# Patient Record
Sex: FEMALE | Race: WHITE | NOT HISPANIC OR LATINO
[De-identification: names, ages, dates, MRNs, and addresses within clinical notes are randomized per-mention and may not be internally consistent; named-entity substitution may affect disease eponyms.]

---

## 2017-01-04 ENCOUNTER — LAB SERVICES (OUTPATIENT)
Dept: OTHER | Age: 32
End: 2017-01-04

## 2017-01-04 ENCOUNTER — CHARTING TRANS (OUTPATIENT)
Dept: OBGYN | Age: 32
End: 2017-01-04

## 2017-01-23 LAB — AP REPORT: NORMAL

## 2017-01-26 LAB — REPORT STATUS: NORMAL

## 2017-03-11 ENCOUNTER — LAB SERVICES (OUTPATIENT)
Dept: OTHER | Age: 32
End: 2017-03-11

## 2017-03-11 LAB
BILIRUBIN URINE: NEGATIVE
BLOOD URINE: ABNORMAL
CLARITY: CLEAR
COLOR: YELLOW
GLUCOSE QUALITATIVE U: NEGATIVE
KETONES, URINE: NEGATIVE
LEUKOCYTE ESTERASE URINE: NEGATIVE
NITRITE URINE: NEGATIVE
PH URINE: 7 (ref 5–7)
SPECIFIC GRAVITY URINE: 1.01 (ref 1–1.03)
URINE PROTEIN, QUAL (DIPSTICK): NEGATIVE
UROBILINOGEN URINE: <2

## 2017-03-13 ENCOUNTER — CHARTING TRANS (OUTPATIENT)
Dept: OTHER | Age: 32
End: 2017-03-13

## 2017-03-15 ENCOUNTER — CHARTING TRANS (OUTPATIENT)
Dept: OTHER | Age: 32
End: 2017-03-15

## 2017-03-18 ENCOUNTER — IMAGING SERVICES (OUTPATIENT)
Dept: OTHER | Age: 32
End: 2017-03-18

## 2017-03-20 ENCOUNTER — CHARTING TRANS (OUTPATIENT)
Dept: OTHER | Age: 32
End: 2017-03-20

## 2017-03-21 ENCOUNTER — CHARTING TRANS (OUTPATIENT)
Dept: OTHER | Age: 32
End: 2017-03-21

## 2017-03-27 ENCOUNTER — CHARTING TRANS (OUTPATIENT)
Dept: UROLOGY | Age: 32
End: 2017-03-27

## 2017-03-27 ENCOUNTER — CHARTING TRANS (OUTPATIENT)
Dept: OTHER | Age: 32
End: 2017-03-27

## 2017-03-29 ENCOUNTER — CHARTING TRANS (OUTPATIENT)
Dept: OTHER | Age: 32
End: 2017-03-29

## 2017-04-07 ENCOUNTER — LAB SERVICES (OUTPATIENT)
Dept: OTHER | Age: 32
End: 2017-04-07

## 2017-04-07 ENCOUNTER — CHARTING TRANS (OUTPATIENT)
Dept: OTHER | Age: 32
End: 2017-04-07

## 2017-04-07 ENCOUNTER — IMAGING SERVICES (OUTPATIENT)
Dept: OTHER | Age: 32
End: 2017-04-07

## 2017-04-07 LAB
AP REPORT: NORMAL
BASOPHIL %: 0.5 % (ref 0–1.2)
BASOPHIL ABSOLUTE #: 0 10*3/UL (ref 0–0.1)
BILIRUBIN URINE: NEGATIVE
BLOOD URINE: ABNORMAL
BUN SERPL-MCNC: 7 MG/DL (ref 7–20)
CALCIUM SERPL-MCNC: 8.7 MG/DL (ref 8.6–10.6)
CHLORIDE SERPL-SCNC: 101 MMOL/L (ref 96–107)
CLARITY: CLEAR
COLOR: COLORLESS
CREATININE, SERUM: 0.7 MG/DL (ref 0.5–1.4)
DIFFERENTIAL TYPE: ABNORMAL
EOSINOPHIL %: 3.6 % (ref 0–10)
EOSINOPHIL ABSOLUTE #: 0.2 10*3/UL (ref 0–0.5)
GFR SERPL CREATININE-BSD FRML MDRD: >60 ML/MIN/{1.73M2}
GFR SERPL CREATININE-BSD FRML MDRD: >60 ML/MIN/{1.73M2}
GLUCOSE QUALITATIVE U: NEGATIVE
GLUCOSE SERPL-MCNC: 72 MG/DL (ref 70–200)
HCO3 SERPL-SCNC: 23 MMOL/L (ref 22–32)
HEMATOCRIT: 38.9 % (ref 34–45)
HEMOGLOBIN: 13.2 G/DL (ref 11.2–15.7)
KETONES, URINE: NEGATIVE
LEUKOCYTE ESTERASE URINE: NEGATIVE
LYMPH PERCENT: 18.5 % (ref 20.5–51.1)
LYMPHOCYTE ABSOLUTE #: 1.2 10*3/UL (ref 1.2–3.4)
MEAN CORPUSCULAR HGB CONCENTRATION: 33.9 % (ref 32–36)
MEAN CORPUSCULAR HGB: 32.3 PG (ref 27–34)
MEAN CORPUSCULAR VOLUME: 95.1 FL (ref 79–95)
MEAN PLATELET VOLUME: 9.9 FL (ref 8.6–12.4)
MONOCYTE ABSOLUTE #: 0.6 10*3/UL (ref 0.2–0.9)
MONOCYTE PERCENT: 9.3 % (ref 4.3–12.9)
NEUTROPHIL ABSOLUTE #: 4.5 10*3/UL (ref 1.4–6.5)
NEUTROPHIL PERCENT: 68.1 % (ref 34–73.5)
NITRITE URINE: NEGATIVE
PH URINE: 7 (ref 5–7)
PLATELET COUNT: 291 10*3/UL (ref 150–400)
POTASSIUM SERPL-SCNC: 3.9 MMOL/L (ref 3.5–5.3)
RED BLOOD CELL COUNT: 4.09 10*6/UL (ref 3.7–5.2)
RED CELL DISTRIBUTION WIDTH: 11.2 % (ref 11.3–14.8)
SODIUM SERPL-SCNC: 136 MMOL/L (ref 136–146)
SPECIFIC GRAVITY URINE: 1.03 (ref 1–1.03)
URINE PROTEIN, QUAL (DIPSTICK): NEGATIVE
UROBILINOGEN URINE: <2
WHITE BLOOD CELL COUNT: 6.7 10*3/UL (ref 4–10)

## 2017-04-08 LAB — FINAL REPORT: NORMAL

## 2017-04-09 ENCOUNTER — CHARTING TRANS (OUTPATIENT)
Dept: OTHER | Age: 32
End: 2017-04-09

## 2017-04-11 ENCOUNTER — CHARTING TRANS (OUTPATIENT)
Dept: OTHER | Age: 32
End: 2017-04-11

## 2017-04-18 ENCOUNTER — CHARTING TRANS (OUTPATIENT)
Dept: OTHER | Age: 32
End: 2017-04-18

## 2017-06-12 ENCOUNTER — CHARTING TRANS (OUTPATIENT)
Dept: OTHER | Age: 32
End: 2017-06-12

## 2017-08-31 ENCOUNTER — MYAURORA ACCOUNT LINK (OUTPATIENT)
Dept: OTHER | Age: 32
End: 2017-08-31

## 2017-08-31 ENCOUNTER — CHARTING TRANS (OUTPATIENT)
Dept: URGENT CARE | Age: 32
End: 2017-08-31

## 2017-08-31 ENCOUNTER — LAB SERVICES (OUTPATIENT)
Dept: OTHER | Age: 32
End: 2017-08-31

## 2017-08-31 LAB
BILIRUBIN URINE: NEGATIVE
BLOOD URINE: NEGATIVE
CLARITY: CLEAR
COLOR: ABNORMAL
GLUCOSE QUALITATIVE U: NEGATIVE
KETONES, URINE: NEGATIVE
LEUKOCYTE ESTERASE URINE: ABNORMAL
NITRITE URINE: POSITIVE
PH URINE: 8 (ref 5–7)
SPECIFIC GRAVITY URINE: 1.01 (ref 1–1.03)
URINE PROTEIN, QUAL (DIPSTICK): NEGATIVE
UROBILINOGEN URINE: 4

## 2017-09-02 LAB — FINAL REPORT: ABNORMAL

## 2017-10-09 ENCOUNTER — CHARTING TRANS (OUTPATIENT)
Dept: FAMILY MEDICINE | Age: 32
End: 2017-10-09

## 2017-10-09 ENCOUNTER — MYAURORA ACCOUNT LINK (OUTPATIENT)
Dept: OTHER | Age: 32
End: 2017-10-09

## 2017-10-09 ENCOUNTER — IMAGING SERVICES (OUTPATIENT)
Dept: OTHER | Age: 32
End: 2017-10-09

## 2018-01-11 ENCOUNTER — CHARTING TRANS (OUTPATIENT)
Dept: OTHER | Age: 33
End: 2018-01-11

## 2018-02-07 ENCOUNTER — CHARTING TRANS (OUTPATIENT)
Dept: OTHER | Age: 33
End: 2018-02-07

## 2018-02-07 ENCOUNTER — LAB SERVICES (OUTPATIENT)
Dept: OTHER | Age: 33
End: 2018-02-07

## 2018-02-07 ENCOUNTER — MYAURORA ACCOUNT LINK (OUTPATIENT)
Dept: OTHER | Age: 33
End: 2018-02-07

## 2018-02-14 LAB — AP REPORT: NORMAL

## 2018-06-18 ENCOUNTER — MYAURORA ACCOUNT LINK (OUTPATIENT)
Dept: OTHER | Age: 33
End: 2018-06-18

## 2018-06-18 ENCOUNTER — CHARTING TRANS (OUTPATIENT)
Dept: OTHER | Age: 33
End: 2018-06-18

## 2018-08-07 ENCOUNTER — LAB SERVICES (OUTPATIENT)
Dept: OTHER | Age: 33
End: 2018-08-07

## 2018-08-07 ENCOUNTER — MYAURORA ACCOUNT LINK (OUTPATIENT)
Dept: OTHER | Age: 33
End: 2018-08-07

## 2018-08-07 ENCOUNTER — CHARTING TRANS (OUTPATIENT)
Dept: OTHER | Age: 33
End: 2018-08-07

## 2018-08-07 LAB
BILIRUBIN URINE: NEGATIVE
BLOOD URINE: ABNORMAL
CLARITY: ABNORMAL
COLOR: YELLOW
GLUCOSE QUALITATIVE U: NEGATIVE
KETONES, URINE: 20
LEUKOCYTE ESTERASE URINE: NEGATIVE
NITRITE URINE: NEGATIVE
PH URINE: 6 (ref 5–7)
SPECIFIC GRAVITY URINE: 1.03 (ref 1–1.03)
URINE PROTEIN, QUAL (DIPSTICK): 30
UROBILINOGEN URINE: 2

## 2018-08-07 ASSESSMENT — PAIN SCALES - GENERAL: PAINLEVEL_OUTOF10: 5

## 2018-08-08 LAB — FINAL REPORT: NORMAL

## 2018-11-28 VITALS — BODY MASS INDEX: 19.49 KG/M2 | TEMPERATURE: 99 F | HEIGHT: 63 IN | WEIGHT: 110 LBS

## 2018-11-28 VITALS — HEIGHT: 63 IN | WEIGHT: 116 LBS | BODY MASS INDEX: 20.55 KG/M2

## 2018-11-28 VITALS
DIASTOLIC BLOOD PRESSURE: 70 MMHG | SYSTOLIC BLOOD PRESSURE: 100 MMHG | OXYGEN SATURATION: 97 % | TEMPERATURE: 98.7 F | HEART RATE: 83 BPM | RESPIRATION RATE: 14 BRPM

## 2018-11-29 VITALS
WEIGHT: 112 LBS | BODY MASS INDEX: 19.84 KG/M2 | HEIGHT: 63 IN | DIASTOLIC BLOOD PRESSURE: 70 MMHG | SYSTOLIC BLOOD PRESSURE: 110 MMHG

## 2019-01-17 ENCOUNTER — EXTERNAL RECORD (OUTPATIENT)
Dept: HEALTH INFORMATION MANAGEMENT | Facility: OTHER | Age: 34
End: 2019-01-17

## 2019-03-05 VITALS
HEART RATE: 116 BPM | RESPIRATION RATE: 16 BRPM | TEMPERATURE: 98.9 F | DIASTOLIC BLOOD PRESSURE: 66 MMHG | OXYGEN SATURATION: 98 % | SYSTOLIC BLOOD PRESSURE: 108 MMHG

## 2019-03-06 VITALS
DIASTOLIC BLOOD PRESSURE: 70 MMHG | BODY MASS INDEX: 20.73 KG/M2 | HEIGHT: 63 IN | WEIGHT: 117 LBS | SYSTOLIC BLOOD PRESSURE: 108 MMHG

## 2019-03-13 ENCOUNTER — OFFICE VISIT (OUTPATIENT)
Dept: OBGYN | Age: 34
End: 2019-03-13

## 2019-03-13 VITALS
HEIGHT: 63 IN | DIASTOLIC BLOOD PRESSURE: 68 MMHG | SYSTOLIC BLOOD PRESSURE: 94 MMHG | BODY MASS INDEX: 21.15 KG/M2 | WEIGHT: 119.38 LBS

## 2019-03-13 DIAGNOSIS — Z01.419 GYNECOLOGIC EXAM NORMAL: Primary | ICD-10-CM

## 2019-03-13 PROCEDURE — 99395 PREV VISIT EST AGE 18-39: CPT | Performed by: OBSTETRICS & GYNECOLOGY

## 2019-05-09 ENCOUNTER — WALK IN (OUTPATIENT)
Dept: URGENT CARE | Age: 34
End: 2019-05-09

## 2019-05-09 VITALS
SYSTOLIC BLOOD PRESSURE: 98 MMHG | HEART RATE: 94 BPM | DIASTOLIC BLOOD PRESSURE: 64 MMHG | TEMPERATURE: 99.4 F | RESPIRATION RATE: 16 BRPM | OXYGEN SATURATION: 100 %

## 2019-05-09 DIAGNOSIS — J01.90 ACUTE SINUSITIS, RECURRENCE NOT SPECIFIED, UNSPECIFIED LOCATION: Primary | ICD-10-CM

## 2019-05-09 DIAGNOSIS — R05.9 COUGH: ICD-10-CM

## 2019-05-09 PROCEDURE — 99214 OFFICE O/P EST MOD 30 MIN: CPT | Performed by: FAMILY MEDICINE

## 2019-05-09 RX ORDER — CEFUROXIME AXETIL 500 MG/1
500 TABLET ORAL 2 TIMES DAILY
Qty: 20 TABLET | Refills: 0 | Status: SHIPPED | OUTPATIENT
Start: 2019-05-09 | End: 2019-05-19

## 2019-05-09 RX ORDER — PREDNISONE 50 MG/1
50 TABLET ORAL DAILY
Qty: 5 TABLET | Refills: 0 | Status: SHIPPED | OUTPATIENT
Start: 2019-05-09 | End: 2019-05-14

## 2019-05-09 RX ORDER — PROMETHAZINE HYDROCHLORIDE AND CODEINE PHOSPHATE 6.25; 1 MG/5ML; MG/5ML
5 SYRUP ORAL 4 TIMES DAILY PRN
Qty: 120 ML | Refills: 0 | Status: SHIPPED | OUTPATIENT
Start: 2019-05-09 | End: 2019-06-24 | Stop reason: ALTCHOICE

## 2019-06-24 ENCOUNTER — OFFICE VISIT (OUTPATIENT)
Dept: DERMATOLOGY | Age: 34
End: 2019-06-24

## 2019-06-24 DIAGNOSIS — D48.5 NEOPLASM OF UNCERTAIN BEHAVIOR OF SKIN: Primary | ICD-10-CM

## 2019-06-24 DIAGNOSIS — Z12.83 SCREENING EXAM FOR SKIN CANCER: ICD-10-CM

## 2019-06-24 PROCEDURE — 11300 SHAVE SKIN LESION 0.5 CM/<: CPT | Performed by: DERMATOLOGY

## 2019-06-24 PROCEDURE — 99214 OFFICE O/P EST MOD 30 MIN: CPT | Performed by: DERMATOLOGY

## 2019-06-26 LAB — PATH REPORT PLASRBC-IMP: NORMAL

## 2019-10-12 ENCOUNTER — WALK IN (OUTPATIENT)
Dept: URGENT CARE | Age: 34
End: 2019-10-12

## 2019-10-12 DIAGNOSIS — N39.0 URINARY TRACT INFECTION WITHOUT HEMATURIA, SITE UNSPECIFIED: Primary | ICD-10-CM

## 2019-10-12 DIAGNOSIS — R30.0 DYSURIA: ICD-10-CM

## 2019-10-12 LAB
BACTERIA: ABNORMAL
BILIRUBIN URINE: NEGATIVE
BLOOD URINE: ABNORMAL
CLARITY: ABNORMAL
COLOR: YELLOW
GLUCOSE QUALITATIVE U: NEGATIVE
KETONES, URINE: NEGATIVE
LEUKOCYTE ESTERASE URINE: ABNORMAL
MUCOUS: ABNORMAL
NITRITE URINE: NEGATIVE
PH URINE: 5 (ref 5–7)
RED BLOOD CELLS URINE: ABNORMAL (ref 0–3)
SPECIFIC GRAVITY URINE: 1.03 (ref 1–1.03)
SQUAMOUS EPITHELIAL CELLS: ABNORMAL
URINE PROTEIN, QUAL (DIPSTICK): NEGATIVE
UROBILINOGEN URINE: <2
WHITE BLOOD CELLS URINE: ABNORMAL (ref 0–5)

## 2019-10-12 PROCEDURE — 99214 OFFICE O/P EST MOD 30 MIN: CPT | Performed by: FAMILY MEDICINE

## 2019-10-12 PROCEDURE — 81003 URINALYSIS AUTO W/O SCOPE: CPT | Performed by: FAMILY MEDICINE

## 2019-10-12 PROCEDURE — 87086 URINE CULTURE/COLONY COUNT: CPT | Performed by: FAMILY MEDICINE

## 2019-10-12 RX ORDER — CEFUROXIME AXETIL 500 MG/1
500 TABLET ORAL 2 TIMES DAILY
Qty: 14 TABLET | Refills: 0 | Status: SHIPPED | OUTPATIENT
Start: 2019-10-12 | End: 2019-10-19

## 2019-10-12 RX ORDER — ONDANSETRON 4 MG/1
4 TABLET, ORALLY DISINTEGRATING ORAL
COMMUNITY
Start: 2019-07-05 | End: 2020-03-06 | Stop reason: ALTCHOICE

## 2019-10-12 ASSESSMENT — PAIN SCALES - GENERAL: PAINLEVEL: 5-6

## 2019-10-14 LAB — FINAL REPORT: NORMAL

## 2019-10-15 ENCOUNTER — TELEPHONE (OUTPATIENT)
Dept: FAMILY MEDICINE | Age: 34
End: 2019-10-15

## 2019-10-16 ENCOUNTER — OFFICE VISIT (OUTPATIENT)
Dept: OBGYN | Age: 34
End: 2019-10-16

## 2019-10-16 VITALS
BODY MASS INDEX: 20.38 KG/M2 | SYSTOLIC BLOOD PRESSURE: 108 MMHG | DIASTOLIC BLOOD PRESSURE: 60 MMHG | WEIGHT: 115 LBS | HEIGHT: 63 IN

## 2019-10-16 DIAGNOSIS — N94.9 VAGINAL DISCOMFORT: Primary | ICD-10-CM

## 2019-10-16 LAB
25(OH)D3 SERPL-MCNC: NORMAL NG/ML
CLUE CELLS: NORMAL
TRICHOMONAS ANTIGEN: NORMAL
TRICHOMONAS: NORMAL
WP WBC: NORMAL

## 2019-10-16 PROCEDURE — 87210 SMEAR WET MOUNT SALINE/INK: CPT | Performed by: OBSTETRICS & GYNECOLOGY

## 2019-10-16 PROCEDURE — 99213 OFFICE O/P EST LOW 20 MIN: CPT | Performed by: OBSTETRICS & GYNECOLOGY

## 2019-10-17 ENCOUNTER — APPOINTMENT (OUTPATIENT)
Dept: INTERNAL MEDICINE | Age: 34
End: 2019-10-17

## 2019-11-06 ENCOUNTER — APPOINTMENT (OUTPATIENT)
Dept: FAMILY MEDICINE | Age: 34
End: 2019-11-06

## 2019-11-08 RX ORDER — NITROFURANTOIN MACROCRYSTALS 50 MG/1
50 CAPSULE ORAL DAILY PRN
Qty: 30 CAPSULE | Refills: 3 | Status: SHIPPED | OUTPATIENT
Start: 2019-11-08 | End: 2020-07-17 | Stop reason: SDUPTHER

## 2019-12-19 ENCOUNTER — OFFICE VISIT (OUTPATIENT)
Dept: OBGYN | Age: 34
End: 2019-12-19

## 2019-12-19 ENCOUNTER — LAB SERVICES (OUTPATIENT)
Dept: LAB | Age: 34
End: 2019-12-19

## 2019-12-19 VITALS
BODY MASS INDEX: 21.26 KG/M2 | WEIGHT: 120 LBS | TEMPERATURE: 98.9 F | SYSTOLIC BLOOD PRESSURE: 108 MMHG | DIASTOLIC BLOOD PRESSURE: 70 MMHG | HEIGHT: 63 IN | RESPIRATION RATE: 18 BRPM

## 2019-12-19 DIAGNOSIS — N91.2 AMENORRHEA, UNSPECIFIED: ICD-10-CM

## 2019-12-19 DIAGNOSIS — N91.2 AMENORRHEA: Primary | ICD-10-CM

## 2019-12-19 DIAGNOSIS — N91.2 AMENORRHEA: ICD-10-CM

## 2019-12-19 DIAGNOSIS — Z11.3 SCREEN FOR STD (SEXUALLY TRANSMITTED DISEASE): ICD-10-CM

## 2019-12-19 LAB
BACTERIA: ABNORMAL
BASOPHIL %: 0.2 % (ref 0–1.2)
BASOPHIL ABSOLUTE #: 0 10*3/UL (ref 0–0.1)
BILIRUBIN URINE: NEGATIVE
BLOOD URINE: ABNORMAL
CLARITY: CLEAR
COLOR: ABNORMAL
DIFFERENTIAL TYPE: ABNORMAL
EOSINOPHIL %: 1.7 % (ref 0–10)
EOSINOPHIL ABSOLUTE #: 0.1 10*3/UL (ref 0–0.5)
GLUCOSE QUALITATIVE U: NEGATIVE
HBV SURFACE AG SERPL QL IA: NEGATIVE
HEMATOCRIT: 35.2 % (ref 34–45)
HEMOGLOBIN: 12.4 G/DL (ref 11.2–15.7)
HIV1+2 AB SERPL QL IA: NEGATIVE
KETONES, URINE: NEGATIVE
LEUKOCYTE ESTERASE URINE: NEGATIVE
LYMPH PERCENT: 20.6 % (ref 20.5–51.1)
LYMPHOCYTE ABSOLUTE #: 1.7 10*3/UL (ref 1.2–3.4)
MEAN CORPUSCULAR HGB CONCENTRATION: 35.2 % (ref 32–36)
MEAN CORPUSCULAR HGB: 31.9 PG (ref 27–34)
MEAN CORPUSCULAR VOLUME: 90.5 FL (ref 79–95)
MEAN PLATELET VOLUME: 9.2 FL (ref 8.6–12.4)
MONOCYTE ABSOLUTE #: 0.8 10*3/UL (ref 0.2–0.9)
MONOCYTE PERCENT: 9.5 % (ref 4.3–12.9)
NEUTROPHIL ABSOLUTE #: 5.5 10*3/UL (ref 1.4–6.5)
NEUTROPHIL PERCENT: 68 % (ref 34–73.5)
NITRITE URINE: NEGATIVE
PH URINE: 7 (ref 5–7)
PLATELET COUNT: 315 10*3/UL (ref 150–400)
RED BLOOD CELL COUNT: 3.89 10*6/UL (ref 3.7–5.2)
RED BLOOD CELLS URINE: ABNORMAL (ref 0–3)
RED CELL DISTRIBUTION WIDTH: 11.2 % (ref 11.3–14.8)
RUBV IGG SERPL QL IA: NORMAL [IU]/ML
SPECIFIC GRAVITY URINE: 1.01 (ref 1–1.03)
SQUAMOUS EPITHELIAL CELLS: ABNORMAL
URINE PROTEIN, QUAL (DIPSTICK): NEGATIVE
UROBILINOGEN URINE: <2
WHITE BLOOD CELL COUNT: 8.1 10*3/UL (ref 4–10)
WHITE BLOOD CELLS URINE: ABNORMAL (ref 0–5)

## 2019-12-19 PROCEDURE — 87591 N.GONORRHOEAE DNA AMP PROB: CPT | Performed by: NURSE PRACTITIONER

## 2019-12-19 PROCEDURE — 86703 HIV-1/HIV-2 1 RESULT ANTBDY: CPT | Performed by: NURSE PRACTITIONER

## 2019-12-19 PROCEDURE — 81003 URINALYSIS AUTO W/O SCOPE: CPT | Performed by: NURSE PRACTITIONER

## 2019-12-19 PROCEDURE — 99214 OFFICE O/P EST MOD 30 MIN: CPT | Performed by: NURSE PRACTITIONER

## 2019-12-19 PROCEDURE — 36415 COLL VENOUS BLD VENIPUNCTURE: CPT | Performed by: NURSE PRACTITIONER

## 2019-12-19 PROCEDURE — 87491 CHLMYD TRACH DNA AMP PROBE: CPT | Performed by: NURSE PRACTITIONER

## 2019-12-19 PROCEDURE — 87086 URINE CULTURE/COLONY COUNT: CPT | Performed by: NURSE PRACTITIONER

## 2019-12-20 LAB
ABO + RH BLD: NORMAL
BLD GP AB SCN SERPL QL: NEGATIVE
C TRACH DNA SPEC QL NAA+PROBE: NEGATIVE
FINAL REPORT: NORMAL
N GONORRHOEA DNA SPEC QL NAA+PROBE: NEGATIVE

## 2019-12-22 LAB — RPR SER QL: NORMAL

## 2019-12-27 ENCOUNTER — OFFICE VISIT (OUTPATIENT)
Dept: FAMILY MEDICINE | Age: 34
End: 2019-12-27

## 2019-12-27 VITALS
RESPIRATION RATE: 16 BRPM | HEART RATE: 86 BPM | BODY MASS INDEX: 20.73 KG/M2 | SYSTOLIC BLOOD PRESSURE: 100 MMHG | WEIGHT: 117 LBS | HEIGHT: 63 IN | DIASTOLIC BLOOD PRESSURE: 65 MMHG

## 2019-12-27 DIAGNOSIS — Z80.3 FAMILY HISTORY OF BREAST CANCER: Primary | ICD-10-CM

## 2019-12-27 DIAGNOSIS — Z00.00 ROUTINE GENERAL MEDICAL EXAMINATION AT A HEALTH CARE FACILITY: ICD-10-CM

## 2019-12-27 DIAGNOSIS — Z80.0 FAMILY HISTORY OF COLON CANCER: ICD-10-CM

## 2019-12-27 DIAGNOSIS — Z28.21 REFUSED INFLUENZA VACCINE: ICD-10-CM

## 2019-12-27 DIAGNOSIS — Z3A.01 LESS THAN 8 WEEKS GESTATION OF PREGNANCY: ICD-10-CM

## 2019-12-27 PROCEDURE — 99395 PREV VISIT EST AGE 18-39: CPT | Performed by: FAMILY MEDICINE

## 2019-12-27 SDOH — HEALTH STABILITY: MENTAL HEALTH
STRESS IS WHEN SOMEONE FEELS TENSE, NERVOUS, ANXIOUS, OR CAN'T SLEEP AT NIGHT BECAUSE THEIR MIND IS TROUBLED. HOW STRESSED ARE YOU?: NOT AT ALL

## 2019-12-27 SDOH — SOCIAL STABILITY: SOCIAL NETWORK: IN A TYPICAL WEEK, HOW MANY TIMES DO YOU TALK ON THE PHONE WITH FAMILY, FRIENDS, OR NEIGHBORS?: PATIENT DECLINED

## 2019-12-27 SDOH — SOCIAL STABILITY: SOCIAL NETWORK: ARE YOU MARRIED, WIDOWED, DIVORCED, SEPARATED, NEVER MARRIED, OR LIVING WITH A PARTNER?: PATIENT DECLINED

## 2019-12-27 SDOH — SOCIAL STABILITY: SOCIAL NETWORK: HOW OFTEN DO YOU ATTENT MEETINGS OF THE CLUB OR ORGANIZATION YOU BELONG TO?: PATIENT DECLINED

## 2019-12-27 SDOH — SOCIAL STABILITY: SOCIAL NETWORK: HOW OFTEN DO YOU GET TOGETHER WITH FRIENDS OR RELATIVES?: PATIENT DECLINED

## 2019-12-27 SDOH — SOCIAL STABILITY: SOCIAL NETWORK: HOW OFTEN DO YOU ATTEND CHURCH OR RELIGIOUS SERVICES?: PATIENT DECLINED

## 2019-12-27 SDOH — SOCIAL STABILITY: SOCIAL NETWORK
DO YOU BELONG TO ANY CLUBS OR ORGANIZATIONS SUCH AS CHURCH GROUPS UNIONS, FRATERNAL OR ATHLETIC GROUPS, OR SCHOOL GROUPS?: PATIENT DECLINED

## 2019-12-27 SDOH — HEALTH STABILITY: PHYSICAL HEALTH: ON AVERAGE, HOW MANY DAYS PER WEEK DO YOU ENGAGE IN MODERATE TO STRENUOUS EXERCISE (LIKE A BRISK WALK)?: 6 DAYS

## 2019-12-27 SDOH — HEALTH STABILITY: PHYSICAL HEALTH: ON AVERAGE, HOW MANY MINUTES DO YOU ENGAGE IN EXERCISE AT THIS LEVEL?: 60 MIN

## 2019-12-27 ASSESSMENT — ENCOUNTER SYMPTOMS
SORE THROAT: 1
SLEEP DISTURBANCE: 1

## 2019-12-27 ASSESSMENT — PATIENT HEALTH QUESTIONNAIRE - PHQ9
2. FEELING DOWN, DEPRESSED OR HOPELESS: NOT AT ALL
SUM OF ALL RESPONSES TO PHQ9 QUESTIONS 1 AND 2: 0
1. LITTLE INTEREST OR PLEASURE IN DOING THINGS: NOT AT ALL
SUM OF ALL RESPONSES TO PHQ9 QUESTIONS 1 AND 2: 0

## 2020-01-11 PROBLEM — N39.0 RECURRENT UTI: Status: ACTIVE | Noted: 2020-01-11

## 2020-01-15 ENCOUNTER — FIRST OB VISIT (OUTPATIENT)
Dept: OBGYN | Age: 35
End: 2020-01-15

## 2020-01-15 ENCOUNTER — APPOINTMENT (OUTPATIENT)
Dept: ULTRASOUND IMAGING | Age: 35
End: 2020-01-15
Attending: NURSE PRACTITIONER

## 2020-01-15 ENCOUNTER — TELEPHONE (OUTPATIENT)
Dept: OBGYN | Age: 35
End: 2020-01-15

## 2020-01-15 ENCOUNTER — IMAGING SERVICES (OUTPATIENT)
Dept: ULTRASOUND IMAGING | Age: 35
End: 2020-01-15
Attending: NURSE PRACTITIONER

## 2020-01-15 VITALS
WEIGHT: 118.25 LBS | HEIGHT: 63 IN | DIASTOLIC BLOOD PRESSURE: 64 MMHG | BODY MASS INDEX: 20.95 KG/M2 | SYSTOLIC BLOOD PRESSURE: 100 MMHG

## 2020-01-15 DIAGNOSIS — O03.9 MISCARRIAGE: Primary | ICD-10-CM

## 2020-01-15 DIAGNOSIS — N91.2 AMENORRHEA: ICD-10-CM

## 2020-01-15 PROCEDURE — 99214 OFFICE O/P EST MOD 30 MIN: CPT | Performed by: OBSTETRICS & GYNECOLOGY

## 2020-01-15 PROCEDURE — 76817 TRANSVAGINAL US OBSTETRIC: CPT | Performed by: RADIOLOGY

## 2020-01-15 PROCEDURE — 76801 OB US < 14 WKS SINGLE FETUS: CPT | Performed by: RADIOLOGY

## 2020-01-17 ENCOUNTER — E-ADVICE (OUTPATIENT)
Dept: OBGYN | Age: 35
End: 2020-01-17

## 2020-01-17 PROCEDURE — 59820 CARE OF MISCARRIAGE: CPT | Performed by: OBSTETRICS & GYNECOLOGY

## 2020-01-20 ENCOUNTER — TELEPHONE (OUTPATIENT)
Dept: OBGYN | Age: 35
End: 2020-01-20

## 2020-01-23 ENCOUNTER — TELEPHONE (OUTPATIENT)
Dept: OBGYN | Age: 35
End: 2020-01-23

## 2020-01-24 ENCOUNTER — TELEPHONE (OUTPATIENT)
Dept: OBGYN | Age: 35
End: 2020-01-24

## 2020-02-05 ENCOUNTER — OFFICE VISIT (OUTPATIENT)
Dept: OBGYN | Age: 35
End: 2020-02-05

## 2020-02-05 ENCOUNTER — LAB SERVICES (OUTPATIENT)
Dept: LAB | Age: 35
End: 2020-02-05

## 2020-02-05 VITALS
BODY MASS INDEX: 20.9 KG/M2 | SYSTOLIC BLOOD PRESSURE: 112 MMHG | WEIGHT: 118 LBS | TEMPERATURE: 98.3 F | DIASTOLIC BLOOD PRESSURE: 60 MMHG

## 2020-02-05 DIAGNOSIS — O03.9 MISCARRIAGE: ICD-10-CM

## 2020-02-05 DIAGNOSIS — Z98.890 POST-OPERATIVE STATE: Primary | ICD-10-CM

## 2020-02-05 LAB
B-HCG SERPL-ACNC: 1124 M[IU]/ML (ref 0–6)
BASOPHIL %: 0.5 % (ref 0–1.2)
BASOPHIL ABSOLUTE #: 0 10*3/UL (ref 0–0.1)
DIFFERENTIAL TYPE: ABNORMAL
EOSINOPHIL %: 2.8 % (ref 0–10)
EOSINOPHIL ABSOLUTE #: 0.2 10*3/UL (ref 0–0.5)
HEMATOCRIT: 37.6 % (ref 34–45)
HEMOGLOBIN: 12.8 G/DL (ref 11.2–15.7)
LYMPH PERCENT: 23 % (ref 20.5–51.1)
LYMPHOCYTE ABSOLUTE #: 1.4 10*3/UL (ref 1.2–3.4)
MEAN CORPUSCULAR HGB CONCENTRATION: 34 % (ref 32–36)
MEAN CORPUSCULAR HGB: 31.6 PG (ref 27–34)
MEAN CORPUSCULAR VOLUME: 92.8 FL (ref 79–95)
MEAN PLATELET VOLUME: 8.8 FL (ref 8.6–12.4)
MONOCYTE ABSOLUTE #: 0.5 10*3/UL (ref 0.2–0.9)
MONOCYTE PERCENT: 8.6 % (ref 4.3–12.9)
NEUTROPHIL ABSOLUTE #: 4 10*3/UL (ref 1.4–6.5)
NEUTROPHIL PERCENT: 65.1 % (ref 34–73.5)
PLATELET COUNT: 298 10*3/UL (ref 150–400)
RED BLOOD CELL COUNT: 4.05 10*6/UL (ref 3.7–5.2)
RED CELL DISTRIBUTION WIDTH: 11.2 % (ref 11.3–14.8)
WHITE BLOOD CELL COUNT: 6.1 10*3/UL (ref 4–10)

## 2020-02-05 PROCEDURE — 85025 COMPLETE CBC W/AUTO DIFF WBC: CPT | Performed by: OBSTETRICS & GYNECOLOGY

## 2020-02-05 PROCEDURE — 99024 POSTOP FOLLOW-UP VISIT: CPT | Performed by: OBSTETRICS & GYNECOLOGY

## 2020-02-05 PROCEDURE — 84702 CHORIONIC GONADOTROPIN TEST: CPT | Performed by: OBSTETRICS & GYNECOLOGY

## 2020-02-05 PROCEDURE — 36415 COLL VENOUS BLD VENIPUNCTURE: CPT | Performed by: OBSTETRICS & GYNECOLOGY

## 2020-02-06 DIAGNOSIS — Z98.890 STATUS POST D&C: Primary | ICD-10-CM

## 2020-02-15 ENCOUNTER — LAB SERVICES (OUTPATIENT)
Dept: LAB | Age: 35
End: 2020-02-15

## 2020-02-15 DIAGNOSIS — Z98.890 STATUS POST D&C: ICD-10-CM

## 2020-02-15 LAB — B-HCG SERPL-ACNC: 549 M[IU]/ML (ref 0–6)

## 2020-02-15 PROCEDURE — 84702 CHORIONIC GONADOTROPIN TEST: CPT | Performed by: OBSTETRICS & GYNECOLOGY

## 2020-02-15 PROCEDURE — 36415 COLL VENOUS BLD VENIPUNCTURE: CPT | Performed by: OBSTETRICS & GYNECOLOGY

## 2020-02-17 DIAGNOSIS — O03.9 MISCARRIAGE: Primary | ICD-10-CM

## 2020-02-25 ENCOUNTER — LAB SERVICES (OUTPATIENT)
Dept: LAB | Age: 35
End: 2020-02-25

## 2020-02-25 DIAGNOSIS — O03.9 MISCARRIAGE: ICD-10-CM

## 2020-02-25 LAB — B-HCG SERPL-ACNC: 297 M[IU]/ML (ref 0–6)

## 2020-02-25 PROCEDURE — 36415 COLL VENOUS BLD VENIPUNCTURE: CPT | Performed by: OBSTETRICS & GYNECOLOGY

## 2020-02-25 PROCEDURE — 84702 CHORIONIC GONADOTROPIN TEST: CPT | Performed by: OBSTETRICS & GYNECOLOGY

## 2020-02-26 ENCOUNTER — TELEPHONE (OUTPATIENT)
Dept: OBGYN | Age: 35
End: 2020-02-26

## 2020-02-26 DIAGNOSIS — O03.9 MISCARRIAGE: Primary | ICD-10-CM

## 2020-03-03 ENCOUNTER — LAB SERVICES (OUTPATIENT)
Dept: LAB | Age: 35
End: 2020-03-03

## 2020-03-03 DIAGNOSIS — O03.9 MISCARRIAGE: ICD-10-CM

## 2020-03-03 LAB — B-HCG SERPL-ACNC: 201 M[IU]/ML (ref 0–6)

## 2020-03-03 PROCEDURE — 84702 CHORIONIC GONADOTROPIN TEST: CPT | Performed by: OBSTETRICS & GYNECOLOGY

## 2020-03-03 PROCEDURE — 36415 COLL VENOUS BLD VENIPUNCTURE: CPT | Performed by: OBSTETRICS & GYNECOLOGY

## 2020-03-04 ENCOUNTER — LAB SERVICES (OUTPATIENT)
Dept: LAB | Age: 35
End: 2020-03-04

## 2020-03-04 DIAGNOSIS — O03.9 MISCARRIAGE: Primary | ICD-10-CM

## 2020-03-04 DIAGNOSIS — O03.9 MISCARRIAGE: ICD-10-CM

## 2020-03-04 PROCEDURE — 84443 ASSAY THYROID STIM HORMONE: CPT | Performed by: OBSTETRICS & GYNECOLOGY

## 2020-03-04 PROCEDURE — 36415 COLL VENOUS BLD VENIPUNCTURE: CPT | Performed by: OBSTETRICS & GYNECOLOGY

## 2020-03-05 LAB — TSH SERPL DL<=0.05 MIU/L-ACNC: 1.41 M[IU]/L (ref 0.3–4.82)

## 2020-03-06 ENCOUNTER — OB CHECK (OUTPATIENT)
Dept: OBGYN | Age: 35
End: 2020-03-06

## 2020-03-06 ENCOUNTER — TELEPHONE (OUTPATIENT)
Dept: OBGYN | Age: 35
End: 2020-03-06

## 2020-03-06 ENCOUNTER — IMAGING SERVICES (OUTPATIENT)
Dept: ULTRASOUND IMAGING | Age: 35
End: 2020-03-06
Attending: OBSTETRICS & GYNECOLOGY

## 2020-03-06 VITALS
DIASTOLIC BLOOD PRESSURE: 60 MMHG | WEIGHT: 121 LBS | RESPIRATION RATE: 18 BRPM | HEART RATE: 66 BPM | SYSTOLIC BLOOD PRESSURE: 110 MMHG | BODY MASS INDEX: 21.44 KG/M2 | TEMPERATURE: 99.2 F | HEIGHT: 63 IN

## 2020-03-06 DIAGNOSIS — N89.8 OTHER SPECIFIED NONINFLAMMATORY DISORDERS OF VAGINA: ICD-10-CM

## 2020-03-06 DIAGNOSIS — O03.9 MISCARRIAGE: ICD-10-CM

## 2020-03-06 DIAGNOSIS — R39.9 UNSPECIFIED SYMPTOMS AND SIGNS INVOLVING THE GENITOURINARY SYSTEM: ICD-10-CM

## 2020-03-06 DIAGNOSIS — N89.8 VAGINAL DISCHARGE: ICD-10-CM

## 2020-03-06 DIAGNOSIS — R39.9 UTI SYMPTOMS: ICD-10-CM

## 2020-03-06 LAB
25(OH)D3 SERPL-MCNC: NORMAL NG/ML
BACTERIA: ABNORMAL
BILIRUBIN URINE: NEGATIVE
BLOOD URINE: ABNORMAL
CLARITY: CLEAR
CLUE CELLS: NORMAL
COLOR: YELLOW
GLUCOSE QUALITATIVE U: NEGATIVE
KETONES, URINE: NEGATIVE
LEUKOCYTE ESTERASE URINE: NEGATIVE
MUCOUS: ABNORMAL
NITRITE URINE: NEGATIVE
PH URINE: 7 (ref 5–7)
RED BLOOD CELLS URINE: ABNORMAL (ref 0–3)
SPECIFIC GRAVITY URINE: 1 (ref 1–1.03)
SQUAMOUS EPITHELIAL CELLS: 0
TRICHOMONAS ANTIGEN: NORMAL
TRICHOMONAS: NORMAL
URINE PROTEIN, QUAL (DIPSTICK): NEGATIVE
UROBILINOGEN URINE: <2
WHITE BLOOD CELLS URINE: ABNORMAL (ref 0–5)
WP WBC: NORMAL

## 2020-03-06 PROCEDURE — 76817 TRANSVAGINAL US OBSTETRIC: CPT | Performed by: RADIOLOGY

## 2020-03-06 PROCEDURE — 99214 OFFICE O/P EST MOD 30 MIN: CPT | Performed by: OBSTETRICS & GYNECOLOGY

## 2020-03-06 PROCEDURE — 76801 OB US < 14 WKS SINGLE FETUS: CPT | Performed by: RADIOLOGY

## 2020-03-06 PROCEDURE — 87210 SMEAR WET MOUNT SALINE/INK: CPT | Performed by: OBSTETRICS & GYNECOLOGY

## 2020-03-06 PROCEDURE — 81003 URINALYSIS AUTO W/O SCOPE: CPT | Performed by: OBSTETRICS & GYNECOLOGY

## 2020-03-06 PROCEDURE — 87086 URINE CULTURE/COLONY COUNT: CPT | Performed by: OBSTETRICS & GYNECOLOGY

## 2020-03-06 RX ORDER — METHYLERGONOVINE MALEATE 0.2 MG/1
TABLET ORAL
COMMUNITY
Start: 2020-01-17 | End: 2020-03-06 | Stop reason: ALTCHOICE

## 2020-03-07 LAB — FINAL REPORT: NORMAL

## 2020-03-13 ENCOUNTER — EXTERNAL RECORD (OUTPATIENT)
Dept: HEALTH INFORMATION MANAGEMENT | Facility: OTHER | Age: 35
End: 2020-03-13

## 2020-03-14 ENCOUNTER — EXTERNAL RECORD (OUTPATIENT)
Dept: HEALTH INFORMATION MANAGEMENT | Facility: OTHER | Age: 35
End: 2020-03-14

## 2020-03-25 ENCOUNTER — TELEPHONE (OUTPATIENT)
Dept: OBGYN | Age: 35
End: 2020-03-25

## 2020-03-26 ENCOUNTER — OFFICE VISIT (OUTPATIENT)
Dept: OBGYN | Age: 35
End: 2020-03-26

## 2020-03-26 ENCOUNTER — LAB SERVICES (OUTPATIENT)
Dept: LAB | Age: 35
End: 2020-03-26

## 2020-03-26 VITALS
TEMPERATURE: 97.7 F | DIASTOLIC BLOOD PRESSURE: 60 MMHG | HEIGHT: 63 IN | WEIGHT: 120.13 LBS | SYSTOLIC BLOOD PRESSURE: 100 MMHG | BODY MASS INDEX: 21.29 KG/M2 | RESPIRATION RATE: 16 BRPM | HEART RATE: 68 BPM

## 2020-03-26 DIAGNOSIS — N89.8 VAGINAL DISCHARGE: ICD-10-CM

## 2020-03-26 DIAGNOSIS — O03.4 RETAINED PRODUCTS OF CONCEPTION AFTER MISCARRIAGE: ICD-10-CM

## 2020-03-26 DIAGNOSIS — Z30.09 BIRTH CONTROL COUNSELING: ICD-10-CM

## 2020-03-26 DIAGNOSIS — Z09 POSTOP CHECK: Primary | ICD-10-CM

## 2020-03-26 LAB — B-HCG SERPL-ACNC: <2 M[IU]/ML (ref 0–6)

## 2020-03-26 PROCEDURE — 36415 COLL VENOUS BLD VENIPUNCTURE: CPT | Performed by: OBSTETRICS & GYNECOLOGY

## 2020-03-26 PROCEDURE — 84702 CHORIONIC GONADOTROPIN TEST: CPT | Performed by: OBSTETRICS & GYNECOLOGY

## 2020-03-26 PROCEDURE — 99024 POSTOP FOLLOW-UP VISIT: CPT | Performed by: OBSTETRICS & GYNECOLOGY

## 2020-04-22 PROBLEM — Z31.5 ENCOUNTER FOR PROCREATIVE GENETIC COUNSELING: Status: ACTIVE | Noted: 2020-04-22

## 2020-04-22 PROBLEM — O02.1 MISSED ABORTION (CMD): Status: ACTIVE | Noted: 2020-01-17

## 2020-06-06 ENCOUNTER — OFFICE VISIT (OUTPATIENT)
Dept: OPHTHALMOLOGY | Age: 35
End: 2020-06-06

## 2020-06-06 DIAGNOSIS — H57.89 IRRITATION OF LEFT EYE: Primary | ICD-10-CM

## 2020-06-06 PROCEDURE — 99202 OFFICE O/P NEW SF 15 MIN: CPT | Performed by: OPHTHALMOLOGY

## 2020-07-14 RX ORDER — NITROFURANTOIN MACROCRYSTALS 50 MG/1
50 CAPSULE ORAL DAILY PRN
Qty: 30 CAPSULE | Refills: 3 | OUTPATIENT
Start: 2020-07-14

## 2020-07-17 RX ORDER — NITROFURANTOIN MACROCRYSTALS 50 MG/1
50 CAPSULE ORAL DAILY PRN
Qty: 30 CAPSULE | Refills: 3 | Status: SHIPPED | OUTPATIENT
Start: 2020-07-17 | End: 2020-08-19 | Stop reason: ALTCHOICE

## 2020-08-11 ENCOUNTER — LAB SERVICES (OUTPATIENT)
Dept: LAB | Age: 35
End: 2020-08-11

## 2020-08-11 ENCOUNTER — E-ADVICE (OUTPATIENT)
Dept: OBGYN | Age: 35
End: 2020-08-11

## 2020-08-11 ENCOUNTER — NURSE ONLY (OUTPATIENT)
Dept: OBGYN | Age: 35
End: 2020-08-11

## 2020-08-11 DIAGNOSIS — O26.859 SPOTTING IN EARLY PREGNANCY: ICD-10-CM

## 2020-08-11 DIAGNOSIS — O26.859 SPOTTING IN EARLY PREGNANCY: Primary | ICD-10-CM

## 2020-08-11 LAB — B-HCG SERPL-ACNC: ABNORMAL M[IU]/ML (ref 0–6)

## 2020-08-11 PROCEDURE — 84702 CHORIONIC GONADOTROPIN TEST: CPT | Performed by: OBSTETRICS & GYNECOLOGY

## 2020-08-11 PROCEDURE — 36415 COLL VENOUS BLD VENIPUNCTURE: CPT | Performed by: OBSTETRICS & GYNECOLOGY

## 2020-08-11 PROCEDURE — 86850 RBC ANTIBODY SCREEN: CPT | Performed by: OBSTETRICS & GYNECOLOGY

## 2020-08-11 PROCEDURE — 96372 THER/PROPH/DIAG INJ SC/IM: CPT

## 2020-08-12 LAB — BLD GP AB SCN SERPL QL: NEGATIVE

## 2020-08-13 ENCOUNTER — LAB SERVICES (OUTPATIENT)
Dept: LAB | Age: 35
End: 2020-08-13

## 2020-08-13 DIAGNOSIS — O26.859 SPOTTING IN EARLY PREGNANCY: ICD-10-CM

## 2020-08-13 LAB — B-HCG SERPL-ACNC: ABNORMAL M[IU]/ML (ref 0–6)

## 2020-08-13 PROCEDURE — 36415 COLL VENOUS BLD VENIPUNCTURE: CPT | Performed by: OBSTETRICS & GYNECOLOGY

## 2020-08-13 PROCEDURE — 84702 CHORIONIC GONADOTROPIN TEST: CPT | Performed by: OBSTETRICS & GYNECOLOGY

## 2020-08-19 ENCOUNTER — OFFICE VISIT (OUTPATIENT)
Dept: OBGYN | Age: 35
End: 2020-08-19

## 2020-08-19 VITALS
RESPIRATION RATE: 17 BRPM | SYSTOLIC BLOOD PRESSURE: 100 MMHG | HEART RATE: 62 BPM | BODY MASS INDEX: 20.24 KG/M2 | WEIGHT: 114.25 LBS | HEIGHT: 63 IN | TEMPERATURE: 98.3 F | DIASTOLIC BLOOD PRESSURE: 60 MMHG

## 2020-08-19 DIAGNOSIS — Z11.3 ENCOUNTER FOR SCREENING FOR INFECTIONS WITH A PREDOMINANTLY SEXUAL MODE OF TRANSMISSION: ICD-10-CM

## 2020-08-19 DIAGNOSIS — Z01.411 ENCNTR FOR GYN EXAM (GENERAL) (ROUTINE) W ABNORMAL FINDINGS: Primary | ICD-10-CM

## 2020-08-19 DIAGNOSIS — Z01.419 ENCOUNTER FOR ROUTINE GYNECOLOGICAL EXAMINATION WITH PAPANICOLAOU SMEAR OF CERVIX: ICD-10-CM

## 2020-08-19 DIAGNOSIS — Z11.51 SCREENING FOR HUMAN PAPILLOMAVIRUS (HPV): ICD-10-CM

## 2020-08-19 DIAGNOSIS — Z11.8 ENCOUNTER FOR SCREENING EXAMINATION FOR CHLAMYDIAL INFECTION: ICD-10-CM

## 2020-08-19 DIAGNOSIS — Z11.3 ENCOUNTER FOR SCREENING EXAMINATION FOR SEXUALLY TRANSMITTED DISEASE: ICD-10-CM

## 2020-08-19 DIAGNOSIS — Z11.8 ENCOUNTER FOR SCREENING FOR OTHER INFECTIOUS AND PARASITIC DISEASES: ICD-10-CM

## 2020-08-19 DIAGNOSIS — R35.0 URINE FREQUENCY: ICD-10-CM

## 2020-08-19 DIAGNOSIS — N91.2 AMENORRHEA: ICD-10-CM

## 2020-08-19 DIAGNOSIS — N91.2 AMENORRHEA: Primary | ICD-10-CM

## 2020-08-19 LAB
APPEARANCE, POC: CLEAR
BILIRUBIN, POC: NEGATIVE
COLOR, POC: YELLOW
GLUCOSE UR-MCNC: NEGATIVE MG/DL
KETONES, POC: NEGATIVE
NITRITE, POC: NEGATIVE
OCCULT BLOOD, POC: NORMAL
PH UR: 5.5 [PH] (ref 5–7)
PROT UR-MCNC: NEGATIVE G/DL
SP GR UR: 1.03 (ref 1–1.03)
UROBILINOGEN UR-MCNC: 0.2 MG/DL (ref 0–1)
WBC (LEUKOCYTE) ESTERASE, POC: NEGATIVE

## 2020-08-19 PROCEDURE — 88141 CYTOPATH C/V INTERPRET: CPT | Performed by: PATHOLOGY

## 2020-08-19 PROCEDURE — 87591 N.GONORRHOEAE DNA AMP PROB: CPT | Performed by: PATHOLOGY

## 2020-08-19 PROCEDURE — 81002 URINALYSIS NONAUTO W/O SCOPE: CPT | Performed by: NURSE PRACTITIONER

## 2020-08-19 PROCEDURE — 88142 CYTOPATH C/V THIN LAYER: CPT | Performed by: PATHOLOGY

## 2020-08-19 PROCEDURE — 99214 OFFICE O/P EST MOD 30 MIN: CPT | Performed by: NURSE PRACTITIONER

## 2020-08-19 PROCEDURE — 87086 URINE CULTURE/COLONY COUNT: CPT | Performed by: PATHOLOGY

## 2020-08-19 PROCEDURE — 99395 PREV VISIT EST AGE 18-39: CPT | Performed by: NURSE PRACTITIONER

## 2020-08-19 PROCEDURE — 87491 CHLMYD TRACH DNA AMP PROBE: CPT | Performed by: PATHOLOGY

## 2020-08-19 RX ORDER — VITAMIN A ACETATE, BETA CAROTENE, ASCORBIC ACID, CHOLECALCIFEROL, .ALPHA.-TOCOPHEROL ACETATE, DL-, THIAMINE MONONITRATE, RIBOFLAVIN, NIACINAMIDE, PYRIDOXINE HYDROCHLORIDE, FOLIC ACID, CYANOCOBALAMIN, CALCIUM CARBONATE, FERROUS FUMARATE, ZINC OXIDE, CUPRIC OXIDE 3080; 12; 120; 400; 1; 1.84; 3; 20; 22; 920; 25; 200; 27; 10; 2 [IU]/1; UG/1; MG/1; [IU]/1; MG/1; MG/1; MG/1; MG/1; MG/1; [IU]/1; MG/1; MG/1; MG/1; MG/1; MG/1
1 TABLET, FILM COATED ORAL DAILY
COMMUNITY
End: 2020-09-30 | Stop reason: ALTCHOICE

## 2020-08-20 LAB
C TRACH RRNA SPEC QL NAA+PROBE: NEGATIVE
FINAL REPORT: NORMAL
N GONORRHOEA RRNA SPEC QL NAA+PROBE: NEGATIVE
SPECIMEN SOURCE: NORMAL

## 2020-08-31 ENCOUNTER — TELEPHONE (OUTPATIENT)
Dept: OBGYN | Age: 35
End: 2020-08-31

## 2020-08-31 ENCOUNTER — FIRST OB VISIT (OUTPATIENT)
Dept: OBGYN | Age: 35
End: 2020-08-31

## 2020-08-31 ENCOUNTER — LAB SERVICES (OUTPATIENT)
Dept: LAB | Age: 35
End: 2020-08-31

## 2020-08-31 ENCOUNTER — IMAGING SERVICES (OUTPATIENT)
Dept: ULTRASOUND IMAGING | Age: 35
End: 2020-08-31
Attending: NURSE PRACTITIONER

## 2020-08-31 VITALS
TEMPERATURE: 98.4 F | HEIGHT: 63 IN | HEART RATE: 66 BPM | WEIGHT: 110 LBS | RESPIRATION RATE: 18 BRPM | DIASTOLIC BLOOD PRESSURE: 68 MMHG | SYSTOLIC BLOOD PRESSURE: 110 MMHG | BODY MASS INDEX: 19.49 KG/M2

## 2020-08-31 DIAGNOSIS — Z34.90 EARLY STAGE OF PREGNANCY: Primary | ICD-10-CM

## 2020-08-31 DIAGNOSIS — Z87.59 HISTORY OF MISCARRIAGE: ICD-10-CM

## 2020-08-31 DIAGNOSIS — Z34.90 EARLY STAGE OF PREGNANCY: ICD-10-CM

## 2020-08-31 DIAGNOSIS — N91.2 AMENORRHEA: ICD-10-CM

## 2020-08-31 DIAGNOSIS — O26.859 SPOTTING IN EARLY PREGNANCY: ICD-10-CM

## 2020-08-31 LAB
AP REPORT: NORMAL
B-HCG SERPL-ACNC: ABNORMAL M[IU]/ML (ref 0–6)

## 2020-08-31 PROCEDURE — 84702 CHORIONIC GONADOTROPIN TEST: CPT | Performed by: OBSTETRICS & GYNECOLOGY

## 2020-08-31 PROCEDURE — 76801 OB US < 14 WKS SINGLE FETUS: CPT | Performed by: RADIOLOGY

## 2020-08-31 PROCEDURE — 99214 OFFICE O/P EST MOD 30 MIN: CPT | Performed by: OBSTETRICS & GYNECOLOGY

## 2020-08-31 PROCEDURE — 76817 TRANSVAGINAL US OBSTETRIC: CPT | Performed by: RADIOLOGY

## 2020-08-31 PROCEDURE — 36415 COLL VENOUS BLD VENIPUNCTURE: CPT | Performed by: OBSTETRICS & GYNECOLOGY

## 2020-08-31 RX ORDER — UREA 10 %
800 LOTION (ML) TOPICAL DAILY
COMMUNITY
End: 2020-09-30 | Stop reason: ALTCHOICE

## 2020-09-02 ENCOUNTER — LAB SERVICES (OUTPATIENT)
Dept: LAB | Age: 35
End: 2020-09-02

## 2020-09-02 DIAGNOSIS — O03.9 MISCARRIAGE: ICD-10-CM

## 2020-09-02 LAB — HPV I/H RISK 4 DNA CVX QL NAA+PROBE: NORMAL

## 2020-09-02 PROCEDURE — 84702 CHORIONIC GONADOTROPIN TEST: CPT | Performed by: OBSTETRICS & GYNECOLOGY

## 2020-09-02 PROCEDURE — 36415 COLL VENOUS BLD VENIPUNCTURE: CPT | Performed by: OBSTETRICS & GYNECOLOGY

## 2020-09-03 ENCOUNTER — E-ADVICE (OUTPATIENT)
Dept: OBGYN | Age: 35
End: 2020-09-03

## 2020-09-03 LAB — B-HCG SERPL-ACNC: ABNORMAL M[IU]/ML (ref 0–6)

## 2020-09-11 ENCOUNTER — TELEPHONE (OUTPATIENT)
Dept: OBGYN | Age: 35
End: 2020-09-11

## 2020-09-11 ENCOUNTER — OFFICE VISIT (OUTPATIENT)
Dept: OBGYN | Age: 35
End: 2020-09-11

## 2020-09-11 ENCOUNTER — IMAGING SERVICES (OUTPATIENT)
Dept: ULTRASOUND IMAGING | Age: 35
End: 2020-09-11
Attending: OBSTETRICS & GYNECOLOGY

## 2020-09-11 VITALS
HEIGHT: 63 IN | HEART RATE: 68 BPM | DIASTOLIC BLOOD PRESSURE: 60 MMHG | BODY MASS INDEX: 20.24 KG/M2 | TEMPERATURE: 98.6 F | RESPIRATION RATE: 18 BRPM | WEIGHT: 114.25 LBS | SYSTOLIC BLOOD PRESSURE: 110 MMHG

## 2020-09-11 DIAGNOSIS — O02.1 ABORTION, MISSED: Primary | ICD-10-CM

## 2020-09-11 DIAGNOSIS — Z34.90 EARLY STAGE OF PREGNANCY: ICD-10-CM

## 2020-09-11 PROCEDURE — 76801 OB US < 14 WKS SINGLE FETUS: CPT | Performed by: RADIOLOGY

## 2020-09-11 PROCEDURE — 99214 OFFICE O/P EST MOD 30 MIN: CPT | Performed by: OBSTETRICS & GYNECOLOGY

## 2020-09-11 PROCEDURE — 76817 TRANSVAGINAL US OBSTETRIC: CPT | Performed by: RADIOLOGY

## 2020-09-16 ENCOUNTER — TELEPHONE (OUTPATIENT)
Dept: OBGYN | Age: 35
End: 2020-09-16

## 2020-09-16 ENCOUNTER — EXTERNAL RECORD (OUTPATIENT)
Dept: INTERNAL MEDICINE | Age: 35
End: 2020-09-16

## 2020-09-16 PROCEDURE — 76998 US GUIDE INTRAOP: CPT | Performed by: OBSTETRICS & GYNECOLOGY

## 2020-09-16 PROCEDURE — 59820 CARE OF MISCARRIAGE: CPT | Performed by: OBSTETRICS & GYNECOLOGY

## 2020-09-24 PROBLEM — N96 HISTORY OF RECURRENT MISCARRIAGES, NOT CURRENTLY PREGNANT: Status: ACTIVE | Noted: 2020-09-24

## 2020-09-30 ENCOUNTER — LAB SERVICES (OUTPATIENT)
Dept: LAB | Age: 35
End: 2020-09-30

## 2020-09-30 ENCOUNTER — TELEPHONE (OUTPATIENT)
Dept: OBGYN | Age: 35
End: 2020-09-30

## 2020-09-30 ENCOUNTER — OFFICE VISIT (OUTPATIENT)
Dept: OBGYN | Age: 35
End: 2020-09-30

## 2020-09-30 VITALS
WEIGHT: 113.38 LBS | DIASTOLIC BLOOD PRESSURE: 70 MMHG | RESPIRATION RATE: 16 BRPM | SYSTOLIC BLOOD PRESSURE: 94 MMHG | TEMPERATURE: 97.9 F | BODY MASS INDEX: 20.08 KG/M2 | HEART RATE: 80 BPM

## 2020-09-30 DIAGNOSIS — Z87.59 HISTORY OF MISCARRIAGE: ICD-10-CM

## 2020-09-30 DIAGNOSIS — Z98.890 POST-OPERATIVE STATE: Primary | ICD-10-CM

## 2020-09-30 LAB — B-HCG SERPL-ACNC: 154 M[IU]/ML (ref 0–6)

## 2020-09-30 PROCEDURE — 99024 POSTOP FOLLOW-UP VISIT: CPT | Performed by: OBSTETRICS & GYNECOLOGY

## 2020-09-30 PROCEDURE — 36415 COLL VENOUS BLD VENIPUNCTURE: CPT | Performed by: OBSTETRICS & GYNECOLOGY

## 2020-09-30 PROCEDURE — 84702 CHORIONIC GONADOTROPIN TEST: CPT | Performed by: OBSTETRICS & GYNECOLOGY

## 2020-10-06 ENCOUNTER — EXTERNAL RECORD (OUTPATIENT)
Dept: HEALTH INFORMATION MANAGEMENT | Facility: OTHER | Age: 35
End: 2020-10-06

## 2020-10-07 ENCOUNTER — LAB SERVICES (OUTPATIENT)
Dept: LAB | Age: 35
End: 2020-10-07

## 2020-10-07 DIAGNOSIS — O03.9 MISCARRIAGE: ICD-10-CM

## 2020-10-07 LAB — B-HCG SERPL-ACNC: 47 M[IU]/ML (ref 0–6)

## 2020-10-07 PROCEDURE — 36415 COLL VENOUS BLD VENIPUNCTURE: CPT | Performed by: OBSTETRICS & GYNECOLOGY

## 2020-10-07 PROCEDURE — 84702 CHORIONIC GONADOTROPIN TEST: CPT | Performed by: OBSTETRICS & GYNECOLOGY

## 2020-10-08 DIAGNOSIS — O02.1 ABORTION, MISSED: Primary | ICD-10-CM

## 2020-10-13 ENCOUNTER — EXTERNAL RECORD (OUTPATIENT)
Dept: HEALTH INFORMATION MANAGEMENT | Facility: OTHER | Age: 35
End: 2020-10-13

## 2020-10-14 ENCOUNTER — TELEPHONE (OUTPATIENT)
Dept: OBGYN | Age: 35
End: 2020-10-14

## 2020-10-14 DIAGNOSIS — N96 RECURRENT PREGNANCY LOSS: Primary | ICD-10-CM

## 2020-10-22 ENCOUNTER — EXTERNAL RECORD (OUTPATIENT)
Dept: HEALTH INFORMATION MANAGEMENT | Facility: OTHER | Age: 35
End: 2020-10-22

## 2020-10-22 PROCEDURE — 58558 HYSTEROSCOPY BIOPSY: CPT | Performed by: OBSTETRICS & GYNECOLOGY

## 2020-11-02 ENCOUNTER — TELEPHONE (OUTPATIENT)
Dept: OBGYN | Age: 35
End: 2020-11-02

## 2020-11-10 ENCOUNTER — EXTERNAL RECORD (OUTPATIENT)
Dept: HEALTH INFORMATION MANAGEMENT | Facility: OTHER | Age: 35
End: 2020-11-10

## 2020-11-30 ENCOUNTER — TELEPHONE (OUTPATIENT)
Dept: FAMILY MEDICINE | Age: 35
End: 2020-11-30

## 2020-12-31 ENCOUNTER — OFFICE VISIT (OUTPATIENT)
Dept: FAMILY MEDICINE | Age: 35
End: 2020-12-31

## 2020-12-31 VITALS
DIASTOLIC BLOOD PRESSURE: 60 MMHG | TEMPERATURE: 99.1 F | WEIGHT: 113 LBS | RESPIRATION RATE: 16 BRPM | BODY MASS INDEX: 20.02 KG/M2 | HEART RATE: 68 BPM | SYSTOLIC BLOOD PRESSURE: 98 MMHG | HEIGHT: 63 IN

## 2020-12-31 DIAGNOSIS — Z28.21 REFUSED INFLUENZA VACCINE: ICD-10-CM

## 2020-12-31 DIAGNOSIS — Z28.21 REFUSES TETANUS, DIPHTHERIA, AND ACELLULAR PERTUSSIS (TDAP) VACCINATION: ICD-10-CM

## 2020-12-31 DIAGNOSIS — Z00.00 ROUTINE GENERAL MEDICAL EXAMINATION AT A HEALTH CARE FACILITY: Primary | ICD-10-CM

## 2020-12-31 PROBLEM — N96 HISTORY OF RECURRENT MISCARRIAGES: Status: ACTIVE | Noted: 2020-10-22

## 2020-12-31 PROCEDURE — 99395 PREV VISIT EST AGE 18-39: CPT | Performed by: FAMILY MEDICINE

## 2020-12-31 RX ORDER — VITAMIN A ACETATE, BETA CAROTENE, ASCORBIC ACID, CHOLECALCIFEROL, .ALPHA.-TOCOPHEROL ACETATE, DL-, THIAMINE MONONITRATE, RIBOFLAVIN, NIACINAMIDE, PYRIDOXINE HYDROCHLORIDE, FOLIC ACID, CYANOCOBALAMIN, CALCIUM CARBONATE, FERROUS FUMARATE, ZINC OXIDE, CUPRIC OXIDE 3080; 12; 120; 400; 1; 1.84; 3; 20; 22; 920; 25; 200; 27; 10; 2 [IU]/1; UG/1; MG/1; [IU]/1; MG/1; MG/1; MG/1; MG/1; MG/1; [IU]/1; MG/1; MG/1; MG/1; MG/1; MG/1
1 TABLET, FILM COATED ORAL DAILY
COMMUNITY

## 2020-12-31 RX ORDER — PROGESTERONE 100 MG/1
INSERT VAGINAL
COMMUNITY
Start: 2020-12-29 | End: 2021-02-19 | Stop reason: SDUPTHER

## 2020-12-31 ASSESSMENT — PATIENT HEALTH QUESTIONNAIRE - PHQ9
SUM OF ALL RESPONSES TO PHQ9 QUESTIONS 1 AND 2: 0
2. FEELING DOWN, DEPRESSED OR HOPELESS: NOT AT ALL
CLINICAL INTERPRETATION OF PHQ2 SCORE: NO FURTHER SCREENING NEEDED
CLINICAL INTERPRETATION OF PHQ9 SCORE: NO FURTHER SCREENING NEEDED
1. LITTLE INTEREST OR PLEASURE IN DOING THINGS: NOT AT ALL
SUM OF ALL RESPONSES TO PHQ9 QUESTIONS 1 AND 2: 0

## 2020-12-31 ASSESSMENT — ENCOUNTER SYMPTOMS: SLEEP DISTURBANCE: 1

## 2021-02-03 ENCOUNTER — TELEPHONE (OUTPATIENT)
Dept: OBGYN | Age: 36
End: 2021-02-03

## 2021-02-03 DIAGNOSIS — Z34.01 ENCOUNTER FOR SUPERVISION OF NORMAL FIRST PREGNANCY IN FIRST TRIMESTER: Primary | ICD-10-CM

## 2021-02-05 ENCOUNTER — TELEPHONE (OUTPATIENT)
Dept: OBGYN | Age: 36
End: 2021-02-05

## 2021-02-14 DIAGNOSIS — Z23 NEED FOR VACCINATION: ICD-10-CM

## 2021-02-19 ENCOUNTER — LAB SERVICES (OUTPATIENT)
Dept: LAB | Age: 36
End: 2021-02-19

## 2021-02-19 ENCOUNTER — IMAGING SERVICES (OUTPATIENT)
Dept: ULTRASOUND IMAGING | Age: 36
End: 2021-02-19
Attending: OBSTETRICS & GYNECOLOGY

## 2021-02-19 ENCOUNTER — FIRST OB VISIT (OUTPATIENT)
Dept: OBGYN | Age: 36
End: 2021-02-19

## 2021-02-19 VITALS
RESPIRATION RATE: 16 BRPM | TEMPERATURE: 99.6 F | DIASTOLIC BLOOD PRESSURE: 76 MMHG | WEIGHT: 116.4 LBS | HEART RATE: 90 BPM | HEIGHT: 63 IN | BODY MASS INDEX: 20.62 KG/M2 | SYSTOLIC BLOOD PRESSURE: 104 MMHG

## 2021-02-19 DIAGNOSIS — N91.2 AMENORRHEA: ICD-10-CM

## 2021-02-19 DIAGNOSIS — O02.1 ABORTION, MISSED: ICD-10-CM

## 2021-02-19 DIAGNOSIS — O09.299 HISTORY OF MISCARRIAGE, CURRENTLY PREGNANT: Primary | ICD-10-CM

## 2021-02-19 DIAGNOSIS — R35.0 URINARY FREQUENCY: ICD-10-CM

## 2021-02-19 DIAGNOSIS — O46.8X1 SUBCHORIONIC HEMORRHAGE OF PLACENTA IN FIRST TRIMESTER, SINGLE OR UNSPECIFIED FETUS: ICD-10-CM

## 2021-02-19 DIAGNOSIS — Z11.3 SCREEN FOR STD (SEXUALLY TRANSMITTED DISEASE): ICD-10-CM

## 2021-02-19 DIAGNOSIS — Z34.90 EARLY STAGE OF PREGNANCY: ICD-10-CM

## 2021-02-19 DIAGNOSIS — O41.8X10 SUBCHORIONIC HEMORRHAGE OF PLACENTA IN FIRST TRIMESTER, SINGLE OR UNSPECIFIED FETUS: ICD-10-CM

## 2021-02-19 DIAGNOSIS — Z34.01 ENCOUNTER FOR SUPERVISION OF NORMAL FIRST PREGNANCY IN FIRST TRIMESTER: ICD-10-CM

## 2021-02-19 LAB
APPEARANCE, POC: NORMAL
BASOPHIL %: 0.5 % (ref 0–1.2)
BASOPHIL ABSOLUTE #: 0.1 10*3/UL (ref 0–0.1)
BILIRUBIN, POC: NEGATIVE
COLOR, POC: YELLOW
DIFFERENTIAL TYPE: ABNORMAL
EOSINOPHIL %: 1.2 % (ref 0–10)
EOSINOPHIL ABSOLUTE #: 0.1 10*3/UL (ref 0–0.5)
GLUCOSE UR-MCNC: NEGATIVE MG/DL
HEMATOCRIT: 37.7 % (ref 34–45)
HEMOGLOBIN: 12.7 G/DL (ref 11.2–15.7)
IMMATURE GRANULOCYTE ABSOLUTE: 0.03 10*3/UL (ref 0–0.05)
IMMATURE GRANULOCYTE PERCENT: 0.3 % (ref 0–0.5)
KETONES, POC: NEGATIVE
LYMPH PERCENT: 18.4 % (ref 20.5–51.1)
LYMPHOCYTE ABSOLUTE #: 2 10*3/UL (ref 1.2–3.4)
MEAN CORPUSCULAR HGB CONCENTRATION: 33.7 % (ref 32–36)
MEAN CORPUSCULAR HGB: 31.5 PG (ref 27–34)
MEAN CORPUSCULAR VOLUME: 93.5 FL (ref 79–95)
MEAN PLATELET VOLUME: 9.9 FL (ref 8.6–12.4)
MONOCYTE ABSOLUTE #: 0.8 10*3/UL (ref 0.2–0.9)
MONOCYTE PERCENT: 7.4 % (ref 4.3–12.9)
NEUTROPHIL ABSOLUTE #: 8 10*3/UL (ref 1.4–6.5)
NEUTROPHIL PERCENT: 72.2 % (ref 34–73.5)
NITRITE, POC: NEGATIVE
OCCULT BLOOD, POC: NORMAL
PH UR: 7.5 [PH] (ref 5–9)
PLATELET COUNT: 320 10*3/UL (ref 150–400)
PROT UR-MCNC: NEGATIVE G/DL
RED BLOOD CELL COUNT: 4.03 10*6/UL (ref 3.7–5.2)
RED CELL DISTRIBUTION WIDTH: 11.1 % (ref 11.3–14.8)
SP GR UR: 1.02 (ref 1–1.03)
UROBILINOGEN UR-MCNC: 0.2 MG/DL (ref 0–1)
WBC (LEUKOCYTE) ESTERASE, POC: NEGATIVE
WHITE BLOOD CELL COUNT: 11 10*3/UL (ref 4–10)

## 2021-02-19 PROCEDURE — 99214 OFFICE O/P EST MOD 30 MIN: CPT | Performed by: NURSE PRACTITIONER

## 2021-02-19 PROCEDURE — 76817 TRANSVAGINAL US OBSTETRIC: CPT | Performed by: RADIOLOGY

## 2021-02-19 PROCEDURE — 86900 BLOOD TYPING SEROLOGIC ABO: CPT | Performed by: CLINICAL MEDICAL LABORATORY

## 2021-02-19 PROCEDURE — 86703 HIV-1/HIV-2 1 RESULT ANTBDY: CPT | Performed by: OBSTETRICS & GYNECOLOGY

## 2021-02-19 PROCEDURE — 76801 OB US < 14 WKS SINGLE FETUS: CPT | Performed by: RADIOLOGY

## 2021-02-19 PROCEDURE — 85025 COMPLETE CBC W/AUTO DIFF WBC: CPT | Performed by: OBSTETRICS & GYNECOLOGY

## 2021-02-19 PROCEDURE — PSEU8027 ANTIBODY SCREEN: Performed by: CLINICAL MEDICAL LABORATORY

## 2021-02-19 PROCEDURE — 86762 RUBELLA ANTIBODY: CPT | Performed by: OBSTETRICS & GYNECOLOGY

## 2021-02-19 PROCEDURE — 87086 URINE CULTURE/COLONY COUNT: CPT | Performed by: PATHOLOGY

## 2021-02-19 PROCEDURE — 81003 URINALYSIS AUTO W/O SCOPE: CPT | Performed by: NURSE PRACTITIONER

## 2021-02-19 PROCEDURE — 86850 RBC ANTIBODY SCREEN: CPT | Performed by: OBSTETRICS & GYNECOLOGY

## 2021-02-19 PROCEDURE — 86900 BLOOD TYPING SEROLOGIC ABO: CPT | Performed by: OBSTETRICS & GYNECOLOGY

## 2021-02-19 PROCEDURE — 87340 HEPATITIS B SURFACE AG IA: CPT | Performed by: OBSTETRICS & GYNECOLOGY

## 2021-02-19 PROCEDURE — PSEU8026 ABO/RH GROUP AND TYPE (D): Performed by: CLINICAL MEDICAL LABORATORY

## 2021-02-19 PROCEDURE — 86901 BLOOD TYPING SEROLOGIC RH(D): CPT | Performed by: OBSTETRICS & GYNECOLOGY

## 2021-02-19 PROCEDURE — 86850 RBC ANTIBODY SCREEN: CPT | Performed by: CLINICAL MEDICAL LABORATORY

## 2021-02-19 PROCEDURE — 36415 COLL VENOUS BLD VENIPUNCTURE: CPT | Performed by: OBSTETRICS & GYNECOLOGY

## 2021-02-19 PROCEDURE — 86901 BLOOD TYPING SEROLOGIC RH(D): CPT | Performed by: CLINICAL MEDICAL LABORATORY

## 2021-02-19 PROCEDURE — 86592 SYPHILIS TEST NON-TREP QUAL: CPT | Performed by: OBSTETRICS & GYNECOLOGY

## 2021-02-19 RX ORDER — PROGESTERONE 100 MG/1
INSERT VAGINAL
Qty: 60 EACH | Refills: 3 | Status: SHIPPED | OUTPATIENT
Start: 2021-02-19 | End: 2021-03-10 | Stop reason: ALTCHOICE

## 2021-02-19 RX ORDER — NITROFURANTOIN MACROCRYSTALS 50 MG/1
CAPSULE ORAL
COMMUNITY
Start: 2020-12-13 | End: 2021-03-10 | Stop reason: ALTCHOICE

## 2021-02-19 ASSESSMENT — PATIENT HEALTH QUESTIONNAIRE - PHQ9
SUM OF ALL RESPONSES TO PHQ9 QUESTIONS 1 AND 2: 0
2. FEELING DOWN, DEPRESSED OR HOPELESS: NOT AT ALL
CLINICAL INTERPRETATION OF PHQ2 SCORE: NO FURTHER SCREENING NEEDED
1. LITTLE INTEREST OR PLEASURE IN DOING THINGS: NOT AT ALL
CLINICAL INTERPRETATION OF PHQ9 SCORE: NO FURTHER SCREENING NEEDED
SUM OF ALL RESPONSES TO PHQ9 QUESTIONS 1 AND 2: 0

## 2021-02-20 ENCOUNTER — LAB REQUISITION (OUTPATIENT)
Dept: LAB | Age: 36
End: 2021-02-20

## 2021-02-20 DIAGNOSIS — Z34.90 ENCOUNTER FOR SUPERVISION OF NORMAL PREGNANCY, UNSPECIFIED, UNSPECIFIED TRIMESTER: ICD-10-CM

## 2021-02-20 DIAGNOSIS — O02.1 MISSED ABORTION: ICD-10-CM

## 2021-02-20 DIAGNOSIS — N91.2 AMENORRHEA, UNSPECIFIED: ICD-10-CM

## 2021-02-20 LAB
FINAL REPORT: NORMAL
HBV SURFACE AG SERPL QL IA: NEGATIVE
HIV1+2 AB SERPL QL IA: NEGATIVE
RPR SER QL: NORMAL

## 2021-02-21 LAB
ABO + RH BLD: NORMAL
ABO + RH BLD: NORMAL
BLD GP AB SCN SERPL QL GEL: NEGATIVE
BLD GP AB SCN SERPL QL GEL: NEGATIVE

## 2021-02-26 LAB — RUBV IGG SERPL QL IA: NORMAL [IU]/ML

## 2021-03-10 ENCOUNTER — TELEPHONE (OUTPATIENT)
Dept: OBGYN | Age: 36
End: 2021-03-10

## 2021-03-10 ENCOUNTER — LAB REQUISITION (OUTPATIENT)
Dept: LAB | Age: 36
End: 2021-03-10

## 2021-03-10 ENCOUNTER — FIRST OB VISIT (OUTPATIENT)
Dept: OBGYN | Age: 36
End: 2021-03-10

## 2021-03-10 ENCOUNTER — IMAGING SERVICES (OUTPATIENT)
Dept: ULTRASOUND IMAGING | Age: 36
End: 2021-03-10
Attending: NURSE PRACTITIONER

## 2021-03-10 VITALS
WEIGHT: 118.38 LBS | DIASTOLIC BLOOD PRESSURE: 70 MMHG | SYSTOLIC BLOOD PRESSURE: 98 MMHG | BODY MASS INDEX: 20.97 KG/M2 | RESPIRATION RATE: 16 BRPM

## 2021-03-10 DIAGNOSIS — Z11.8 ENCOUNTER FOR SCREENING FOR OTHER INFECTIOUS AND PARASITIC DISEASES: ICD-10-CM

## 2021-03-10 DIAGNOSIS — O09.299 HISTORY OF MISCARRIAGE, CURRENTLY PREGNANT: ICD-10-CM

## 2021-03-10 DIAGNOSIS — Z11.8 SCREENING FOR CHLAMYDIAL DISEASE: ICD-10-CM

## 2021-03-10 DIAGNOSIS — Z34.80 PRENATAL CARE OF MULTIGRAVIDA, ANTEPARTUM: Primary | ICD-10-CM

## 2021-03-10 PROCEDURE — 87591 N.GONORRHOEAE DNA AMP PROB: CPT | Performed by: CLINICAL MEDICAL LABORATORY

## 2021-03-10 PROCEDURE — 0502F SUBSEQUENT PRENATAL CARE: CPT | Performed by: OBSTETRICS & GYNECOLOGY

## 2021-03-10 PROCEDURE — 87591 N.GONORRHOEAE DNA AMP PROB: CPT | Performed by: OBSTETRICS & GYNECOLOGY

## 2021-03-10 PROCEDURE — PSEU9913 CHLAMYDIA/GONORRHEA BY NUCLEIC ACID AMPLIFICATION: Performed by: CLINICAL MEDICAL LABORATORY

## 2021-03-10 PROCEDURE — 87491 CHLMYD TRACH DNA AMP PROBE: CPT | Performed by: CLINICAL MEDICAL LABORATORY

## 2021-03-10 PROCEDURE — 76801 OB US < 14 WKS SINGLE FETUS: CPT | Performed by: RADIOLOGY

## 2021-03-10 PROCEDURE — 87491 CHLMYD TRACH DNA AMP PROBE: CPT | Performed by: OBSTETRICS & GYNECOLOGY

## 2021-03-10 RX ORDER — PROGESTERONE 100 MG/1
100 INSERT VAGINAL 2 TIMES DAILY
COMMUNITY
End: 2021-09-23 | Stop reason: ALTCHOICE

## 2021-03-10 ASSESSMENT — LIFESTYLE VARIABLES
DID ANY OF YOUR PARENTS HAVE PROBLEMS WITH ALCOHOL OR DRUG USE?: NO
IN THE PAST MONTH, DID YOU DRINK BEER, WINE OR LIQUOR, OR USE OTHER DRUGS? (PREGNANCY): NO
DO ANY OF YOUR FRIENDS (PEERS) HAVE PROBLEMS WITH ALCOHOL OR DRUG USE?: NO
BEFORE YOU WERE PREGNANT DID YOU HAVE A PROBLEM WITH ALCOHOL OR DRUG USE? (PAST): NO
DOES YOUR PARTNER HAVE A PROBLEM WITH ALCOHOL OR DRUG USE?: NO

## 2021-03-11 LAB
C TRACH RRNA UR QL NAA+PROBE: NEGATIVE
C TRACH RRNA UR QL NAA+PROBE: NEGATIVE
Lab: NORMAL
Lab: NORMAL
N GONORRHOEA RRNA UR QL NAA+PROBE: NEGATIVE
N GONORRHOEA RRNA UR QL NAA+PROBE: NEGATIVE

## 2021-03-20 ENCOUNTER — TELEPHONE (OUTPATIENT)
Dept: OBGYN | Age: 36
End: 2021-03-20

## 2021-03-22 ENCOUNTER — TELEPHONE (OUTPATIENT)
Dept: OBGYN | Age: 36
End: 2021-03-22

## 2021-03-24 ENCOUNTER — OB CHECK (OUTPATIENT)
Dept: OBGYN | Age: 36
End: 2021-03-24

## 2021-03-24 ENCOUNTER — IMAGING SERVICES (OUTPATIENT)
Dept: ULTRASOUND IMAGING | Age: 36
End: 2021-03-24
Attending: OBSTETRICS & GYNECOLOGY

## 2021-03-24 VITALS
DIASTOLIC BLOOD PRESSURE: 68 MMHG | RESPIRATION RATE: 18 BRPM | SYSTOLIC BLOOD PRESSURE: 112 MMHG | HEART RATE: 70 BPM | WEIGHT: 118.25 LBS | BODY MASS INDEX: 20.95 KG/M2 | TEMPERATURE: 97.1 F

## 2021-03-24 DIAGNOSIS — O09.299 HISTORY OF MISCARRIAGE, CURRENTLY PREGNANT: ICD-10-CM

## 2021-03-24 DIAGNOSIS — O09.519 ADVANCED MATERNAL AGE, PRIMIGRAVIDA, ANTEPARTUM: ICD-10-CM

## 2021-03-24 DIAGNOSIS — O26.859 SPOTTING IN PREGNANCY: Primary | ICD-10-CM

## 2021-03-24 DIAGNOSIS — Z11.8 SCREENING FOR CHLAMYDIAL DISEASE: ICD-10-CM

## 2021-03-24 DIAGNOSIS — Z34.80 PRENATAL CARE OF MULTIGRAVIDA, ANTEPARTUM: ICD-10-CM

## 2021-03-24 PROBLEM — Z67.91 RH NEGATIVE STATUS DURING PREGNANCY: Status: ACTIVE | Noted: 2021-03-24

## 2021-03-24 PROBLEM — O26.899 RH NEGATIVE STATUS DURING PREGNANCY: Status: ACTIVE | Noted: 2021-03-24

## 2021-03-24 PROCEDURE — 0502F SUBSEQUENT PRENATAL CARE: CPT | Performed by: OBSTETRICS & GYNECOLOGY

## 2021-03-24 PROCEDURE — 76815 OB US LIMITED FETUS(S): CPT | Performed by: RADIOLOGY

## 2021-04-01 ENCOUNTER — TELEPHONE (OUTPATIENT)
Dept: OBGYN | Age: 36
End: 2021-04-01

## 2021-04-01 DIAGNOSIS — O26.859 SPOTTING IN PREGNANCY: Primary | ICD-10-CM

## 2021-04-06 ENCOUNTER — OB CHECK (OUTPATIENT)
Dept: OBGYN | Age: 36
End: 2021-04-06

## 2021-04-06 ENCOUNTER — LAB SERVICES (OUTPATIENT)
Dept: LAB | Age: 36
End: 2021-04-06

## 2021-04-06 ENCOUNTER — IMAGING SERVICES (OUTPATIENT)
Dept: ULTRASOUND IMAGING | Age: 36
End: 2021-04-06
Attending: OBSTETRICS & GYNECOLOGY

## 2021-04-06 VITALS
SYSTOLIC BLOOD PRESSURE: 96 MMHG | BODY MASS INDEX: 20.83 KG/M2 | RESPIRATION RATE: 12 BRPM | DIASTOLIC BLOOD PRESSURE: 60 MMHG | WEIGHT: 117.6 LBS | TEMPERATURE: 98.2 F | HEART RATE: 70 BPM

## 2021-04-06 DIAGNOSIS — O09.522 MULTIGRAVIDA OF ADVANCED MATERNAL AGE IN SECOND TRIMESTER: ICD-10-CM

## 2021-04-06 DIAGNOSIS — O09.90 SUPERVISION OF HIGH RISK PREGNANCY, ANTEPARTUM: Primary | ICD-10-CM

## 2021-04-06 DIAGNOSIS — O26.859 SPOTTING IN PREGNANCY: ICD-10-CM

## 2021-04-06 PROCEDURE — 76815 OB US LIMITED FETUS(S): CPT | Performed by: RADIOLOGY

## 2021-04-06 PROCEDURE — 36415 COLL VENOUS BLD VENIPUNCTURE: CPT | Performed by: OBSTETRICS & GYNECOLOGY

## 2021-04-06 PROCEDURE — X1094 NO CHARGE VISIT: HCPCS | Performed by: OBSTETRICS & GYNECOLOGY

## 2021-04-06 PROCEDURE — 0502F SUBSEQUENT PRENATAL CARE: CPT | Performed by: OBSTETRICS & GYNECOLOGY

## 2021-04-13 LAB — SEND OUT RESULTS: NORMAL

## 2021-05-04 ENCOUNTER — OB CHECK (OUTPATIENT)
Dept: OBGYN | Age: 36
End: 2021-05-04

## 2021-05-04 VITALS
BODY MASS INDEX: 21.17 KG/M2 | HEART RATE: 68 BPM | TEMPERATURE: 97.1 F | WEIGHT: 119.5 LBS | RESPIRATION RATE: 12 BRPM | SYSTOLIC BLOOD PRESSURE: 110 MMHG | DIASTOLIC BLOOD PRESSURE: 76 MMHG

## 2021-05-04 DIAGNOSIS — O09.92 SUPERVISION OF HIGH RISK PREGNANCY IN SECOND TRIMESTER: Primary | ICD-10-CM

## 2021-05-04 PROCEDURE — 0502F SUBSEQUENT PRENATAL CARE: CPT | Performed by: OBSTETRICS & GYNECOLOGY

## 2021-05-25 VITALS
TEMPERATURE: 97.7 F | HEART RATE: 74 BPM | SYSTOLIC BLOOD PRESSURE: 102 MMHG | OXYGEN SATURATION: 99 % | DIASTOLIC BLOOD PRESSURE: 62 MMHG | RESPIRATION RATE: 16 BRPM

## 2021-06-01 ENCOUNTER — OB CHECK (OUTPATIENT)
Dept: OBGYN | Age: 36
End: 2021-06-01

## 2021-06-01 ENCOUNTER — IMAGING SERVICES (OUTPATIENT)
Dept: ULTRASOUND IMAGING | Age: 36
End: 2021-06-01
Attending: OBSTETRICS & GYNECOLOGY

## 2021-06-01 ENCOUNTER — OB CHECK (OUTPATIENT)
Dept: MATERNAL FETAL MEDICINE | Age: 36
End: 2021-06-01

## 2021-06-01 VITALS
TEMPERATURE: 98.3 F | RESPIRATION RATE: 14 BRPM | WEIGHT: 124.4 LBS | SYSTOLIC BLOOD PRESSURE: 104 MMHG | DIASTOLIC BLOOD PRESSURE: 60 MMHG | HEART RATE: 84 BPM | BODY MASS INDEX: 22.04 KG/M2

## 2021-06-01 VITALS
SYSTOLIC BLOOD PRESSURE: 104 MMHG | TEMPERATURE: 98.3 F | DIASTOLIC BLOOD PRESSURE: 60 MMHG | BODY MASS INDEX: 22.04 KG/M2 | HEART RATE: 84 BPM | RESPIRATION RATE: 14 BRPM | WEIGHT: 124.4 LBS

## 2021-06-01 DIAGNOSIS — Z34.80 PRENATAL CARE OF MULTIGRAVIDA, ANTEPARTUM: ICD-10-CM

## 2021-06-01 DIAGNOSIS — O41.8X90 SUBCHORIONIC HEMATOMA, ANTEPARTUM, SINGLE OR UNSPECIFIED FETUS: ICD-10-CM

## 2021-06-01 DIAGNOSIS — O46.8X9 SUBCHORIONIC HEMATOMA, ANTEPARTUM, SINGLE OR UNSPECIFIED FETUS: ICD-10-CM

## 2021-06-01 DIAGNOSIS — O09.519 ADVANCED MATERNAL AGE, PRIMIGRAVIDA, ANTEPARTUM: Primary | ICD-10-CM

## 2021-06-01 DIAGNOSIS — O09.299 HISTORY OF MISCARRIAGE, CURRENTLY PREGNANT: ICD-10-CM

## 2021-06-01 DIAGNOSIS — Z11.8 SCREENING FOR CHLAMYDIAL DISEASE: ICD-10-CM

## 2021-06-01 DIAGNOSIS — O09.92 SUPERVISION OF HIGH RISK PREGNANCY IN SECOND TRIMESTER: Primary | ICD-10-CM

## 2021-06-01 PROCEDURE — 76811 OB US DETAILED SNGL FETUS: CPT | Performed by: OBSTETRICS & GYNECOLOGY

## 2021-06-01 PROCEDURE — 99203 OFFICE O/P NEW LOW 30 MIN: CPT | Performed by: OBSTETRICS & GYNECOLOGY

## 2021-06-01 PROCEDURE — 0502F SUBSEQUENT PRENATAL CARE: CPT | Performed by: OBSTETRICS & GYNECOLOGY

## 2021-06-04 ENCOUNTER — TELEPHONE (OUTPATIENT)
Dept: OBGYN | Age: 36
End: 2021-06-04

## 2021-06-07 ENCOUNTER — LAB SERVICES (OUTPATIENT)
Dept: LAB | Age: 36
End: 2021-06-07

## 2021-06-07 ENCOUNTER — OB CHECK (OUTPATIENT)
Dept: OBGYN | Age: 36
End: 2021-06-07

## 2021-06-07 VITALS
BODY MASS INDEX: 22.29 KG/M2 | HEART RATE: 88 BPM | TEMPERATURE: 97 F | RESPIRATION RATE: 20 BRPM | WEIGHT: 125.8 LBS | HEIGHT: 63 IN | SYSTOLIC BLOOD PRESSURE: 96 MMHG | DIASTOLIC BLOOD PRESSURE: 68 MMHG

## 2021-06-07 DIAGNOSIS — M54.9 BACK PAIN, UNSPECIFIED BACK LOCATION, UNSPECIFIED BACK PAIN LATERALITY, UNSPECIFIED CHRONICITY: ICD-10-CM

## 2021-06-07 DIAGNOSIS — R30.9 PAIN WITH URINATION: ICD-10-CM

## 2021-06-07 DIAGNOSIS — R30.0 BURNING WITH URINATION: ICD-10-CM

## 2021-06-07 DIAGNOSIS — R39.15 URINARY URGENCY: ICD-10-CM

## 2021-06-07 DIAGNOSIS — M54.6 ACUTE RIGHT-SIDED THORACIC BACK PAIN: Primary | ICD-10-CM

## 2021-06-07 LAB
BACTERIA: ABNORMAL
BILIRUBIN URINE: NEGATIVE
BLOOD URINE: NEGATIVE
CLARITY: ABNORMAL
COLOR: ABNORMAL
GLUCOSE QUALITATIVE U: NEGATIVE
KETONES, URINE: NEGATIVE
LEUKOCYTE ESTERASE URINE: ABNORMAL
NITRITE URINE: NEGATIVE
PH URINE: 8 (ref 5–7)
RED BLOOD CELLS URINE: ABNORMAL (ref 0–3)
SPECIFIC GRAVITY URINE: 1 (ref 1–1.03)
SQUAMOUS EPITHELIAL CELLS: ABNORMAL
URINE PROTEIN, QUAL (DIPSTICK): NEGATIVE
UROBILINOGEN URINE: <2
WHITE BLOOD CELLS URINE: ABNORMAL (ref 0–5)

## 2021-06-07 PROCEDURE — 87086 URINE CULTURE/COLONY COUNT: CPT | Performed by: PHYSICIAN ASSISTANT

## 2021-06-07 PROCEDURE — 81003 URINALYSIS AUTO W/O SCOPE: CPT | Performed by: PHYSICIAN ASSISTANT

## 2021-06-07 PROCEDURE — 99212 OFFICE O/P EST SF 10 MIN: CPT | Performed by: PHYSICIAN ASSISTANT

## 2021-06-08 LAB — FINAL REPORT: NORMAL

## 2021-07-06 ENCOUNTER — OB CHECK (OUTPATIENT)
Dept: OBGYN | Age: 36
End: 2021-07-06

## 2021-07-06 VITALS
HEIGHT: 63 IN | SYSTOLIC BLOOD PRESSURE: 102 MMHG | TEMPERATURE: 96.7 F | DIASTOLIC BLOOD PRESSURE: 66 MMHG | BODY MASS INDEX: 23.08 KG/M2 | WEIGHT: 130.25 LBS

## 2021-07-06 DIAGNOSIS — O09.92 SUPERVISION OF HIGH RISK PREGNANCY IN SECOND TRIMESTER: Primary | ICD-10-CM

## 2021-07-06 PROCEDURE — 0502F SUBSEQUENT PRENATAL CARE: CPT | Performed by: OBSTETRICS & GYNECOLOGY

## 2021-07-22 ENCOUNTER — OB CHECK (OUTPATIENT)
Dept: OBGYN | Age: 36
End: 2021-07-22

## 2021-07-22 ENCOUNTER — LAB SERVICES (OUTPATIENT)
Dept: LAB | Age: 36
End: 2021-07-22

## 2021-07-22 VITALS
RESPIRATION RATE: 14 BRPM | TEMPERATURE: 97.1 F | DIASTOLIC BLOOD PRESSURE: 68 MMHG | BODY MASS INDEX: 23.12 KG/M2 | SYSTOLIC BLOOD PRESSURE: 110 MMHG | WEIGHT: 130.5 LBS | HEART RATE: 78 BPM

## 2021-07-22 DIAGNOSIS — Z34.93 PRENATAL CARE, THIRD TRIMESTER: Primary | ICD-10-CM

## 2021-07-22 DIAGNOSIS — O09.93 SUPERVISION OF HIGH RISK PREGNANCY IN THIRD TRIMESTER: ICD-10-CM

## 2021-07-22 DIAGNOSIS — Z23 NEED FOR TDAP VACCINATION: ICD-10-CM

## 2021-07-22 DIAGNOSIS — Z34.93 PRENATAL CARE, THIRD TRIMESTER: ICD-10-CM

## 2021-07-22 LAB
BASOPHIL %: 0.2 % (ref 0–1.2)
BASOPHIL ABSOLUTE #: 0 10*3/UL (ref 0–0.1)
DIFFERENTIAL TYPE: ABNORMAL
EOSINOPHIL %: 0.6 % (ref 0–10)
EOSINOPHIL ABSOLUTE #: 0.1 10*3/UL (ref 0–0.5)
GLUCOSE 1H P 50 G GLC PO SERPL-MCNC: 96 MG/DL (ref 70–130)
HEMATOCRIT: 34.5 % (ref 34–45)
HEMOGLOBIN: 11.2 G/DL (ref 11.2–15.7)
IMMATURE GRANULOCYTE ABSOLUTE: 0.13 10*3/UL (ref 0–0.05)
IMMATURE GRANULOCYTE PERCENT: 1 % (ref 0–0.5)
LYMPH PERCENT: 11.4 % (ref 20.5–51.1)
LYMPHOCYTE ABSOLUTE #: 1.4 10*3/UL (ref 1.2–3.4)
MEAN CORPUSCULAR HGB CONCENTRATION: 32.5 % (ref 32–36)
MEAN CORPUSCULAR HGB: 33.2 PG (ref 27–34)
MEAN CORPUSCULAR VOLUME: 102.4 FL (ref 79–95)
MEAN PLATELET VOLUME: 10.4 FL (ref 8.6–12.4)
MONOCYTE ABSOLUTE #: 0.7 10*3/UL (ref 0.2–0.9)
MONOCYTE PERCENT: 5.3 % (ref 4.3–12.9)
NEUTROPHIL ABSOLUTE #: 10.2 10*3/UL (ref 1.4–6.5)
NEUTROPHIL PERCENT: 81.5 % (ref 34–73.5)
PLATELET COUNT: 220 10*3/UL (ref 150–400)
RED BLOOD CELL COUNT: 3.37 10*6/UL (ref 3.7–5.2)
RED CELL DISTRIBUTION WIDTH: 12.2 % (ref 11.3–14.8)
WHITE BLOOD CELL COUNT: 12.5 10*3/UL (ref 4–10)

## 2021-07-22 PROCEDURE — 86592 SYPHILIS TEST NON-TREP QUAL: CPT | Performed by: OBSTETRICS & GYNECOLOGY

## 2021-07-22 PROCEDURE — 82950 GLUCOSE TEST: CPT | Performed by: OBSTETRICS & GYNECOLOGY

## 2021-07-22 PROCEDURE — 90471 IMMUNIZATION ADMIN: CPT

## 2021-07-22 PROCEDURE — 86703 HIV-1/HIV-2 1 RESULT ANTBDY: CPT | Performed by: OBSTETRICS & GYNECOLOGY

## 2021-07-22 PROCEDURE — 96372 THER/PROPH/DIAG INJ SC/IM: CPT | Performed by: OBSTETRICS & GYNECOLOGY

## 2021-07-22 PROCEDURE — 0502F SUBSEQUENT PRENATAL CARE: CPT | Performed by: OBSTETRICS & GYNECOLOGY

## 2021-07-22 PROCEDURE — 90715 TDAP VACCINE 7 YRS/> IM: CPT

## 2021-07-22 PROCEDURE — 85025 COMPLETE CBC W/AUTO DIFF WBC: CPT | Performed by: OBSTETRICS & GYNECOLOGY

## 2021-07-22 PROCEDURE — 36415 COLL VENOUS BLD VENIPUNCTURE: CPT | Performed by: OBSTETRICS & GYNECOLOGY

## 2021-07-23 LAB
HIV1+2 AB SERPL QL IA: NEGATIVE
RPR SER QL: NORMAL

## 2021-08-05 ENCOUNTER — APPOINTMENT (OUTPATIENT)
Dept: OBGYN | Age: 36
End: 2021-08-05

## 2021-08-05 ENCOUNTER — OB CHECK (OUTPATIENT)
Dept: OBGYN | Age: 36
End: 2021-08-05

## 2021-08-05 VITALS
HEART RATE: 78 BPM | DIASTOLIC BLOOD PRESSURE: 68 MMHG | RESPIRATION RATE: 12 BRPM | WEIGHT: 132.38 LBS | TEMPERATURE: 97.8 F | SYSTOLIC BLOOD PRESSURE: 110 MMHG | BODY MASS INDEX: 23.45 KG/M2

## 2021-08-05 DIAGNOSIS — Z34.83 PRENATAL CARE, SUBSEQUENT PREGNANCY IN THIRD TRIMESTER: ICD-10-CM

## 2021-08-05 DIAGNOSIS — O09.523 MULTIGRAVIDA OF ADVANCED MATERNAL AGE IN THIRD TRIMESTER: ICD-10-CM

## 2021-08-05 DIAGNOSIS — O09.93 SUPERVISION OF HIGH RISK PREGNANCY IN THIRD TRIMESTER: Primary | ICD-10-CM

## 2021-08-05 PROCEDURE — 0502F SUBSEQUENT PRENATAL CARE: CPT | Performed by: STUDENT IN AN ORGANIZED HEALTH CARE EDUCATION/TRAINING PROGRAM

## 2021-08-05 ASSESSMENT — EDINBURGH POSTNATAL DEPRESSION SCALE (EPDS)
I HAVE LOOKED FORWARD WITH ENJOYMENT TO THINGS: AS MUCH AS I EVER DID
I HAVE BLAMED MYSELF UNNECESSARILY WHEN THINGS WENT WRONG: NO, NEVER
TOTAL SCORE: 1
I HAVE BEEN ABLE TO LAUGH AND SEE THE FUNNY SIDE OF THINGS: AS MUCH AS I ALWAYS COULD
I HAVE BEEN ANXIOUS OR WORRIED FOR NO GOOD REASON: HARDLY EVER
I HAVE BEEN SO UNHAPPY THAT I HAVE BEEN CRYING: NO, NEVER
I HAVE FELT SAD OR MISERABLE: NO, NOT AT ALL
I HAVE BEEN SO UNHAPPY THAT I HAVE HAD DIFFICULTY SLEEPING: NOT AT ALL
I HAVE FELT SCARED OR PANICKY FOR NO GOOD REASON: NO, NOT AT ALL
THINGS HAVE BEEN GETTING ON TOP OF ME: NO, I HAVE BEEN COPING AS WELL AS EVER
THE THOUGHT OF HARMING MYSELF HAS OCCURRED TO ME: NEVER

## 2021-08-11 ENCOUNTER — WALK IN (OUTPATIENT)
Dept: URGENT CARE | Age: 36
End: 2021-08-11

## 2021-08-11 VITALS
TEMPERATURE: 98.1 F | OXYGEN SATURATION: 97 % | SYSTOLIC BLOOD PRESSURE: 118 MMHG | RESPIRATION RATE: 16 BRPM | HEART RATE: 82 BPM | DIASTOLIC BLOOD PRESSURE: 62 MMHG

## 2021-08-11 DIAGNOSIS — S61.219A LACERATION OF FINGER WITHOUT FOREIGN BODY WITHOUT DAMAGE TO NAIL, UNSPECIFIED FINGER, UNSPECIFIED LATERALITY, INITIAL ENCOUNTER: Primary | ICD-10-CM

## 2021-08-11 PROCEDURE — 99213 OFFICE O/P EST LOW 20 MIN: CPT | Performed by: INTERNAL MEDICINE

## 2021-08-11 RX ORDER — CEPHALEXIN 500 MG/1
500 CAPSULE ORAL 3 TIMES DAILY
Qty: 21 CAPSULE | Refills: 0 | Status: SHIPPED | OUTPATIENT
Start: 2021-08-11 | End: 2021-08-18

## 2021-08-17 ENCOUNTER — OB CHECK (OUTPATIENT)
Dept: MATERNAL FETAL MEDICINE | Age: 36
End: 2021-08-17

## 2021-08-17 ENCOUNTER — IMAGING SERVICES (OUTPATIENT)
Dept: ULTRASOUND IMAGING | Age: 36
End: 2021-08-17
Attending: OBSTETRICS & GYNECOLOGY

## 2021-08-17 ENCOUNTER — OB CHECK (OUTPATIENT)
Dept: OBGYN | Age: 36
End: 2021-08-17

## 2021-08-17 VITALS
DIASTOLIC BLOOD PRESSURE: 64 MMHG | WEIGHT: 134.4 LBS | HEART RATE: 80 BPM | SYSTOLIC BLOOD PRESSURE: 102 MMHG | TEMPERATURE: 97.5 F | BODY MASS INDEX: 23.81 KG/M2 | RESPIRATION RATE: 14 BRPM

## 2021-08-17 VITALS
SYSTOLIC BLOOD PRESSURE: 102 MMHG | WEIGHT: 134.4 LBS | DIASTOLIC BLOOD PRESSURE: 64 MMHG | RESPIRATION RATE: 14 BRPM | HEART RATE: 80 BPM | TEMPERATURE: 97.5 F | BODY MASS INDEX: 23.81 KG/M2

## 2021-08-17 DIAGNOSIS — O09.513 PRIMIGRAVIDA OF ADVANCED MATERNAL AGE IN THIRD TRIMESTER: Primary | ICD-10-CM

## 2021-08-17 DIAGNOSIS — O09.519 ADVANCED MATERNAL AGE, PRIMIGRAVIDA, ANTEPARTUM: ICD-10-CM

## 2021-08-17 DIAGNOSIS — O09.93 SUPERVISION OF HIGH RISK PREGNANCY IN THIRD TRIMESTER: Primary | ICD-10-CM

## 2021-08-17 PROCEDURE — 0502F SUBSEQUENT PRENATAL CARE: CPT | Performed by: OBSTETRICS & GYNECOLOGY

## 2021-08-17 PROCEDURE — 76816 OB US FOLLOW-UP PER FETUS: CPT | Performed by: OBSTETRICS & GYNECOLOGY

## 2021-08-17 PROCEDURE — 99213 OFFICE O/P EST LOW 20 MIN: CPT | Performed by: OBSTETRICS & GYNECOLOGY

## 2021-09-02 ENCOUNTER — OB CHECK (OUTPATIENT)
Dept: OBGYN | Age: 36
End: 2021-09-02

## 2021-09-02 ENCOUNTER — MED INFO FORMS (OUTPATIENT)
Dept: HEALTH INFORMATION MANAGEMENT | Facility: OTHER | Age: 36
End: 2021-09-02

## 2021-09-02 VITALS
RESPIRATION RATE: 14 BRPM | HEART RATE: 78 BPM | BODY MASS INDEX: 24 KG/M2 | SYSTOLIC BLOOD PRESSURE: 110 MMHG | TEMPERATURE: 97.2 F | DIASTOLIC BLOOD PRESSURE: 76 MMHG | WEIGHT: 135.5 LBS

## 2021-09-02 DIAGNOSIS — R35.0 URINE FREQUENCY: Primary | ICD-10-CM

## 2021-09-02 DIAGNOSIS — O09.93 SUPERVISION OF HIGH RISK PREGNANCY IN THIRD TRIMESTER: ICD-10-CM

## 2021-09-02 PROCEDURE — 0502F SUBSEQUENT PRENATAL CARE: CPT | Performed by: OBSTETRICS & GYNECOLOGY

## 2021-09-02 PROCEDURE — 87086 URINE CULTURE/COLONY COUNT: CPT | Performed by: OBSTETRICS & GYNECOLOGY

## 2021-09-03 LAB — FINAL REPORT: NORMAL

## 2021-09-16 ENCOUNTER — OB CHECK (OUTPATIENT)
Dept: OBGYN | Age: 36
End: 2021-09-16

## 2021-09-16 ENCOUNTER — IMAGING SERVICES (OUTPATIENT)
Dept: ULTRASOUND IMAGING | Age: 36
End: 2021-09-16
Attending: OBSTETRICS & GYNECOLOGY

## 2021-09-16 VITALS
SYSTOLIC BLOOD PRESSURE: 128 MMHG | HEART RATE: 80 BPM | WEIGHT: 138.4 LBS | RESPIRATION RATE: 16 BRPM | DIASTOLIC BLOOD PRESSURE: 80 MMHG | TEMPERATURE: 98.8 F | BODY MASS INDEX: 24.52 KG/M2

## 2021-09-16 DIAGNOSIS — O09.519 ADVANCED MATERNAL AGE, PRIMIGRAVIDA, ANTEPARTUM: ICD-10-CM

## 2021-09-16 DIAGNOSIS — Z36.85 SCREENING, ANTENATAL, FOR STREPTOCOCCUS B: Primary | ICD-10-CM

## 2021-09-16 DIAGNOSIS — O09.523 MULTIGRAVIDA OF ADVANCED MATERNAL AGE IN THIRD TRIMESTER: Primary | ICD-10-CM

## 2021-09-16 DIAGNOSIS — O09.93 SUPERVISION OF HIGH RISK PREGNANCY IN THIRD TRIMESTER: ICD-10-CM

## 2021-09-16 PROCEDURE — 87081 CULTURE SCREEN ONLY: CPT | Performed by: OBSTETRICS & GYNECOLOGY

## 2021-09-16 PROCEDURE — 87653 STREP B DNA AMP PROBE: CPT | Performed by: OBSTETRICS & GYNECOLOGY

## 2021-09-16 PROCEDURE — 76815 OB US LIMITED FETUS(S): CPT | Performed by: RADIOLOGY

## 2021-09-16 PROCEDURE — 59025 FETAL NON-STRESS TEST: CPT | Performed by: OBSTETRICS & GYNECOLOGY

## 2021-09-16 PROCEDURE — 0502F SUBSEQUENT PRENATAL CARE: CPT | Performed by: OBSTETRICS & GYNECOLOGY

## 2021-09-18 LAB — FINAL REPORT: NORMAL

## 2021-09-23 ENCOUNTER — IMAGING SERVICES (OUTPATIENT)
Dept: ULTRASOUND IMAGING | Age: 36
End: 2021-09-23
Attending: OBSTETRICS & GYNECOLOGY

## 2021-09-23 ENCOUNTER — OB CHECK (OUTPATIENT)
Dept: OBGYN | Age: 36
End: 2021-09-23

## 2021-09-23 VITALS
DIASTOLIC BLOOD PRESSURE: 78 MMHG | BODY MASS INDEX: 24.8 KG/M2 | HEART RATE: 96 BPM | SYSTOLIC BLOOD PRESSURE: 130 MMHG | WEIGHT: 140 LBS | TEMPERATURE: 98.4 F | RESPIRATION RATE: 18 BRPM

## 2021-09-23 DIAGNOSIS — O09.93 SUPERVISION OF HIGH RISK PREGNANCY IN THIRD TRIMESTER: Primary | ICD-10-CM

## 2021-09-23 DIAGNOSIS — O09.523 AMA (ADVANCED MATERNAL AGE) MULTIGRAVIDA 35+, THIRD TRIMESTER: Primary | ICD-10-CM

## 2021-09-23 DIAGNOSIS — O09.519 ADVANCED MATERNAL AGE, PRIMIGRAVIDA, ANTEPARTUM: ICD-10-CM

## 2021-09-23 PROCEDURE — 0502F SUBSEQUENT PRENATAL CARE: CPT | Performed by: OBSTETRICS & GYNECOLOGY

## 2021-09-23 PROCEDURE — 59025 FETAL NON-STRESS TEST: CPT

## 2021-09-23 PROCEDURE — 76815 OB US LIMITED FETUS(S): CPT | Performed by: RADIOLOGY

## 2021-09-30 ENCOUNTER — OB CHECK (OUTPATIENT)
Dept: OBGYN | Age: 36
End: 2021-09-30

## 2021-09-30 ENCOUNTER — IMAGING SERVICES (OUTPATIENT)
Dept: ULTRASOUND IMAGING | Age: 36
End: 2021-09-30
Attending: OBSTETRICS & GYNECOLOGY

## 2021-09-30 ENCOUNTER — TELEPHONE (OUTPATIENT)
Dept: OBGYN | Age: 36
End: 2021-09-30

## 2021-09-30 VITALS
DIASTOLIC BLOOD PRESSURE: 66 MMHG | BODY MASS INDEX: 24.98 KG/M2 | HEART RATE: 92 BPM | WEIGHT: 141 LBS | RESPIRATION RATE: 18 BRPM | TEMPERATURE: 97.9 F | SYSTOLIC BLOOD PRESSURE: 102 MMHG

## 2021-09-30 DIAGNOSIS — O09.523 AMA (ADVANCED MATERNAL AGE) MULTIGRAVIDA 35+, THIRD TRIMESTER: Primary | ICD-10-CM

## 2021-09-30 DIAGNOSIS — O09.93 SUPERVISION OF HIGH RISK PREGNANCY IN THIRD TRIMESTER: Primary | ICD-10-CM

## 2021-09-30 DIAGNOSIS — O09.519 ADVANCED MATERNAL AGE, PRIMIGRAVIDA, ANTEPARTUM: ICD-10-CM

## 2021-09-30 DIAGNOSIS — Z3A.38 38 WEEKS GESTATION OF PREGNANCY: ICD-10-CM

## 2021-09-30 PROCEDURE — 76815 OB US LIMITED FETUS(S): CPT | Performed by: RADIOLOGY

## 2021-09-30 PROCEDURE — 0502F SUBSEQUENT PRENATAL CARE: CPT | Performed by: STUDENT IN AN ORGANIZED HEALTH CARE EDUCATION/TRAINING PROGRAM

## 2021-09-30 PROCEDURE — 59025 FETAL NON-STRESS TEST: CPT

## 2021-10-07 ENCOUNTER — IMAGING SERVICES (OUTPATIENT)
Dept: ULTRASOUND IMAGING | Age: 36
End: 2021-10-07
Attending: OBSTETRICS & GYNECOLOGY

## 2021-10-07 ENCOUNTER — OB CHECK (OUTPATIENT)
Dept: OBGYN | Age: 36
End: 2021-10-07

## 2021-10-07 VITALS
RESPIRATION RATE: 16 BRPM | BODY MASS INDEX: 25.12 KG/M2 | HEART RATE: 84 BPM | WEIGHT: 141.8 LBS | SYSTOLIC BLOOD PRESSURE: 124 MMHG | TEMPERATURE: 98 F | DIASTOLIC BLOOD PRESSURE: 84 MMHG

## 2021-10-07 DIAGNOSIS — Z34.00 ENCOUNTER FOR SUPERVISION PREGNANCY IN PRIMIGRAVIDA, ANTEPARTUM: Primary | ICD-10-CM

## 2021-10-07 DIAGNOSIS — O09.523 MULTIGRAVIDA OF ADVANCED MATERNAL AGE IN THIRD TRIMESTER: ICD-10-CM

## 2021-10-07 DIAGNOSIS — O09.519 ADVANCED MATERNAL AGE, PRIMIGRAVIDA, ANTEPARTUM: ICD-10-CM

## 2021-10-07 PROCEDURE — 76815 OB US LIMITED FETUS(S): CPT | Performed by: RADIOLOGY

## 2021-10-07 PROCEDURE — 0502F SUBSEQUENT PRENATAL CARE: CPT | Performed by: OBSTETRICS & GYNECOLOGY

## 2021-10-07 PROCEDURE — 59025 FETAL NON-STRESS TEST: CPT | Performed by: OBSTETRICS & GYNECOLOGY

## 2021-10-09 PROCEDURE — 59510 CESAREAN DELIVERY: CPT | Performed by: OBSTETRICS & GYNECOLOGY

## 2021-10-14 ENCOUNTER — TELEPHONE (OUTPATIENT)
Dept: OBGYN | Age: 36
End: 2021-10-14

## 2021-10-14 ENCOUNTER — APPOINTMENT (OUTPATIENT)
Dept: ULTRASOUND IMAGING | Age: 36
End: 2021-10-14
Attending: OBSTETRICS & GYNECOLOGY

## 2021-10-25 ENCOUNTER — OFFICE VISIT (OUTPATIENT)
Dept: OBGYN | Age: 36
End: 2021-10-25

## 2021-10-25 VITALS
BODY MASS INDEX: 21.17 KG/M2 | SYSTOLIC BLOOD PRESSURE: 112 MMHG | HEART RATE: 70 BPM | RESPIRATION RATE: 18 BRPM | WEIGHT: 119.5 LBS | TEMPERATURE: 97.2 F | HEIGHT: 63 IN | DIASTOLIC BLOOD PRESSURE: 68 MMHG

## 2021-10-25 DIAGNOSIS — Z98.891 HISTORY OF C-SECTION: Primary | ICD-10-CM

## 2021-10-25 PROCEDURE — 99024 POSTOP FOLLOW-UP VISIT: CPT | Performed by: OBSTETRICS & GYNECOLOGY

## 2021-10-25 ASSESSMENT — EDINBURGH POSTNATAL DEPRESSION SCALE (EPDS)
I HAVE BEEN ABLE TO LAUGH AND SEE THE FUNNY SIDE OF THINGS: AS MUCH AS I ALWAYS COULD
I HAVE BLAMED MYSELF UNNECESSARILY WHEN THINGS WENT WRONG: NOT VERY OFTEN
I HAVE BEEN SO UNHAPPY THAT I HAVE BEEN CRYING: NO, NEVER
I HAVE BEEN SO UNHAPPY THAT I HAVE HAD DIFFICULTY SLEEPING: NOT AT ALL
I HAVE FELT SCARED OR PANICKY FOR NO GOOD REASON: NO, NOT AT ALL
THE THOUGHT OF HARMING MYSELF HAS OCCURRED TO ME: NEVER
THINGS HAVE BEEN GETTING ON TOP OF ME: NO, MOST OF THE TIME I HAVE COPED QUITE WELL
I HAVE LOOKED FORWARD WITH ENJOYMENT TO THINGS: AS MUCH AS I EVER DID
TOTAL SCORE: 3
I HAVE BEEN ANXIOUS OR WORRIED FOR NO GOOD REASON: HARDLY EVER
I HAVE FELT SAD OR MISERABLE: NO, NOT AT ALL

## 2021-11-04 ENCOUNTER — OFFICE VISIT (OUTPATIENT)
Dept: OPHTHALMOLOGY | Age: 36
End: 2021-11-04

## 2021-11-04 DIAGNOSIS — H01.004 BLEPHARITIS OF UPPER EYELIDS OF BOTH EYES, UNSPECIFIED TYPE: ICD-10-CM

## 2021-11-04 DIAGNOSIS — H01.001 BLEPHARITIS OF UPPER EYELIDS OF BOTH EYES, UNSPECIFIED TYPE: ICD-10-CM

## 2021-11-04 DIAGNOSIS — H02.9 EYELID LESION: Primary | ICD-10-CM

## 2021-11-04 PROCEDURE — 99213 OFFICE O/P EST LOW 20 MIN: CPT | Performed by: OPHTHALMOLOGY

## 2021-11-05 ENCOUNTER — TELEPHONE (OUTPATIENT)
Dept: OBGYN | Age: 36
End: 2021-11-05

## 2021-11-19 ENCOUNTER — TELEPHONE (OUTPATIENT)
Dept: OBGYN | Age: 36
End: 2021-11-19

## 2021-11-19 ENCOUNTER — POSTPARTUM VISIT (OUTPATIENT)
Dept: OBGYN | Age: 36
End: 2021-11-19

## 2021-11-19 ENCOUNTER — APPOINTMENT (OUTPATIENT)
Dept: OBGYN | Age: 36
End: 2021-11-19

## 2021-11-19 VITALS
TEMPERATURE: 98 F | BODY MASS INDEX: 21.66 KG/M2 | WEIGHT: 122.25 LBS | SYSTOLIC BLOOD PRESSURE: 110 MMHG | DIASTOLIC BLOOD PRESSURE: 60 MMHG | RESPIRATION RATE: 16 BRPM

## 2021-11-19 DIAGNOSIS — Z09 POSTOP CHECK: ICD-10-CM

## 2021-11-19 DIAGNOSIS — Z30.09 BIRTH CONTROL COUNSELING: ICD-10-CM

## 2021-11-19 PROCEDURE — 0503F POSTPARTUM CARE VISIT: CPT | Performed by: STUDENT IN AN ORGANIZED HEALTH CARE EDUCATION/TRAINING PROGRAM

## 2021-11-19 ASSESSMENT — ENCOUNTER SYMPTOMS
CONSTITUTIONAL NEGATIVE: 1
NEUROLOGICAL NEGATIVE: 1
ALLERGIC/IMMUNOLOGIC NEGATIVE: 1
HEMATOLOGIC/LYMPHATIC NEGATIVE: 1
EYES NEGATIVE: 1
GASTROINTESTINAL NEGATIVE: 1
PSYCHIATRIC NEGATIVE: 1
ENDOCRINE NEGATIVE: 1
RESPIRATORY NEGATIVE: 1

## 2021-11-19 ASSESSMENT — EDINBURGH POSTNATAL DEPRESSION SCALE (EPDS)
I HAVE FELT SAD OR MISERABLE: NO, NOT AT ALL
I HAVE BEEN ANXIOUS OR WORRIED FOR NO GOOD REASON: NO, NOT AT ALL
I HAVE BEEN SO UNHAPPY THAT I HAVE HAD DIFFICULTY SLEEPING: NOT AT ALL
THINGS HAVE BEEN GETTING ON TOP OF ME: NO, MOST OF THE TIME I HAVE COPED QUITE WELL
I HAVE BEEN ABLE TO LAUGH AND SEE THE FUNNY SIDE OF THINGS: AS MUCH AS I ALWAYS COULD
I HAVE BLAMED MYSELF UNNECESSARILY WHEN THINGS WENT WRONG: NOT VERY OFTEN
I HAVE LOOKED FORWARD WITH ENJOYMENT TO THINGS: AS MUCH AS I EVER DID
TOTAL SCORE: 2
THE THOUGHT OF HARMING MYSELF HAS OCCURRED TO ME: NEVER
I HAVE BEEN SO UNHAPPY THAT I HAVE BEEN CRYING: NO, NEVER
I HAVE FELT SCARED OR PANICKY FOR NO GOOD REASON: NO, NOT AT ALL

## 2022-02-22 ENCOUNTER — WALK IN (OUTPATIENT)
Dept: URGENT CARE | Age: 37
End: 2022-02-22

## 2022-02-22 VITALS
OXYGEN SATURATION: 100 % | HEART RATE: 90 BPM | DIASTOLIC BLOOD PRESSURE: 60 MMHG | TEMPERATURE: 96.9 F | RESPIRATION RATE: 16 BRPM | SYSTOLIC BLOOD PRESSURE: 121 MMHG

## 2022-02-22 DIAGNOSIS — J06.9 URI, ACUTE: Primary | ICD-10-CM

## 2022-02-22 PROCEDURE — 99214 OFFICE O/P EST MOD 30 MIN: CPT | Performed by: FAMILY MEDICINE

## 2022-02-22 RX ORDER — DIPHENHYDRAMINE HCL 25 MG
CAPSULE ORAL
COMMUNITY
End: 2023-08-16 | Stop reason: ALTCHOICE

## 2022-02-22 RX ORDER — DEXTROMETHORPHAN HYDROBROMIDE AND PROMETHAZINE HYDROCHLORIDE 15; 6.25 MG/5ML; MG/5ML
5 SYRUP ORAL 4 TIMES DAILY PRN
Qty: 120 ML | Refills: 0 | Status: SHIPPED | OUTPATIENT
Start: 2022-02-22 | End: 2022-03-04

## 2022-02-22 ASSESSMENT — PAIN SCALES - GENERAL: PAINLEVEL: 1

## 2022-04-20 ENCOUNTER — APPOINTMENT (OUTPATIENT)
Dept: FAMILY MEDICINE | Age: 37
End: 2022-04-20

## 2022-04-22 ENCOUNTER — OFFICE VISIT (OUTPATIENT)
Dept: FAMILY MEDICINE | Age: 37
End: 2022-04-22

## 2022-04-22 VITALS
SYSTOLIC BLOOD PRESSURE: 104 MMHG | DIASTOLIC BLOOD PRESSURE: 62 MMHG | RESPIRATION RATE: 16 BRPM | WEIGHT: 119 LBS | HEART RATE: 88 BPM | TEMPERATURE: 97.9 F | BODY MASS INDEX: 21.09 KG/M2 | HEIGHT: 63 IN

## 2022-04-22 DIAGNOSIS — M79.671 RIGHT FOOT PAIN: Primary | ICD-10-CM

## 2022-04-22 PROCEDURE — 99212 OFFICE O/P EST SF 10 MIN: CPT | Performed by: FAMILY MEDICINE

## 2022-04-22 ASSESSMENT — PATIENT HEALTH QUESTIONNAIRE - PHQ9
2. FEELING DOWN, DEPRESSED OR HOPELESS: NOT AT ALL
1. LITTLE INTEREST OR PLEASURE IN DOING THINGS: NOT AT ALL
CLINICAL INTERPRETATION OF PHQ2 SCORE: NO FURTHER SCREENING NEEDED
SUM OF ALL RESPONSES TO PHQ9 QUESTIONS 1 AND 2: 0
SUM OF ALL RESPONSES TO PHQ9 QUESTIONS 1 AND 2: 0

## 2022-05-02 ENCOUNTER — APPOINTMENT (OUTPATIENT)
Dept: PODIATRY | Age: 37
End: 2022-05-02

## 2022-06-07 ENCOUNTER — APPOINTMENT (OUTPATIENT)
Dept: DERMATOLOGY | Age: 37
End: 2022-06-07

## 2022-07-21 DIAGNOSIS — Z30.09 BIRTH CONTROL COUNSELING: ICD-10-CM

## 2022-07-21 RX ORDER — NORGESTREL-ETHINYL ESTRADIOL 0.3-0.03MG
TABLET ORAL
Qty: 84 TABLET | Refills: 0 | Status: SHIPPED | OUTPATIENT
Start: 2022-07-21 | End: 2023-08-16 | Stop reason: ALTCHOICE

## 2022-09-27 ENCOUNTER — OFFICE VISIT (OUTPATIENT)
Dept: OBGYN | Age: 37
End: 2022-09-27

## 2022-09-27 VITALS
TEMPERATURE: 98.4 F | WEIGHT: 117.5 LBS | DIASTOLIC BLOOD PRESSURE: 78 MMHG | RESPIRATION RATE: 14 BRPM | SYSTOLIC BLOOD PRESSURE: 116 MMHG | HEIGHT: 63 IN | BODY MASS INDEX: 20.82 KG/M2

## 2022-09-27 DIAGNOSIS — Z01.419 GYNECOLOGIC EXAM NORMAL: Primary | ICD-10-CM

## 2022-09-27 DIAGNOSIS — Z11.51 SCREENING FOR HUMAN PAPILLOMAVIRUS (HPV): ICD-10-CM

## 2022-09-27 PROCEDURE — 99395 PREV VISIT EST AGE 18-39: CPT | Performed by: OBSTETRICS & GYNECOLOGY

## 2022-09-27 PROCEDURE — PSEU9939 HPV, HIGH RISK: Performed by: CLINICAL MEDICAL LABORATORY

## 2022-09-27 PROCEDURE — 88142 CYTOPATH C/V THIN LAYER: CPT | Performed by: PATHOLOGY

## 2022-09-27 PROCEDURE — 87624 HPV HI-RISK TYP POOLED RSLT: CPT | Performed by: CLINICAL MEDICAL LABORATORY

## 2022-09-27 PROCEDURE — 87624 HPV HI-RISK TYP POOLED RSLT: CPT | Performed by: OBSTETRICS & GYNECOLOGY

## 2022-10-04 ENCOUNTER — LAB REQUISITION (OUTPATIENT)
Dept: LAB | Age: 37
End: 2022-10-04

## 2022-10-04 DIAGNOSIS — Z11.51 ENCOUNTER FOR SCREENING FOR HUMAN PAPILLOMAVIRUS (HPV): ICD-10-CM

## 2022-10-04 DIAGNOSIS — Z01.419 ENCOUNTER FOR GYNECOLOGICAL EXAMINATION (GENERAL) (ROUTINE) WITHOUT ABNORMAL FINDINGS: ICD-10-CM

## 2022-10-04 LAB — AP REPORT: NORMAL

## 2022-10-05 LAB
HPV16+18+45 E6+E7MRNA CVX NAA+PROBE: NEGATIVE
HPV16+18+45 E6+E7MRNA CVX NAA+PROBE: NEGATIVE
Lab: NORMAL
Lab: NORMAL

## 2022-11-18 ENCOUNTER — WALK IN (OUTPATIENT)
Dept: URGENT CARE | Age: 37
End: 2022-11-18

## 2022-11-18 VITALS
WEIGHT: 115 LBS | RESPIRATION RATE: 18 BRPM | OXYGEN SATURATION: 100 % | BODY MASS INDEX: 20.37 KG/M2 | TEMPERATURE: 97.5 F | HEART RATE: 88 BPM

## 2022-11-18 DIAGNOSIS — R30.0 DYSURIA: Primary | ICD-10-CM

## 2022-11-18 LAB
APPEARANCE, POC: ABNORMAL
BILIRUBIN, POC: NEGATIVE
COLOR, POC: YELLOW
GLUCOSE UR-MCNC: NEGATIVE MG/DL
KETONES, POC: NEGATIVE MG/DL
NITRITE, POC: NEGATIVE
OCCULT BLOOD, POC: ABNORMAL
PH UR: 5.5 [PH] (ref 5–7)
PROT UR-MCNC: NEGATIVE MG/DL
SP GR UR: 1.01 (ref 1–1.03)
UROBILINOGEN UR-MCNC: 0.2 MG/DL (ref 0–1)
WBC (LEUKOCYTE) ESTERASE, POC: ABNORMAL

## 2022-11-18 PROCEDURE — 87086 URINE CULTURE/COLONY COUNT: CPT | Performed by: INTERNAL MEDICINE

## 2022-11-18 PROCEDURE — 99214 OFFICE O/P EST MOD 30 MIN: CPT | Performed by: INTERNAL MEDICINE

## 2022-11-18 PROCEDURE — 81002 URINALYSIS NONAUTO W/O SCOPE: CPT | Performed by: INTERNAL MEDICINE

## 2022-11-18 PROCEDURE — 87077 CULTURE AEROBIC IDENTIFY: CPT | Performed by: INTERNAL MEDICINE

## 2022-11-18 RX ORDER — PHENAZOPYRIDINE HYDROCHLORIDE 200 MG/1
200 TABLET, FILM COATED ORAL 3 TIMES DAILY PRN
Qty: 10 TABLET | Refills: 0 | Status: SHIPPED | OUTPATIENT
Start: 2022-11-18 | End: 2023-08-16 | Stop reason: ALTCHOICE

## 2022-11-18 RX ORDER — SULFAMETHOXAZOLE AND TRIMETHOPRIM 800; 160 MG/1; MG/1
1 TABLET ORAL 2 TIMES DAILY
Qty: 10 TABLET | Refills: 0 | Status: SHIPPED | OUTPATIENT
Start: 2022-11-18 | End: 2022-11-23

## 2022-11-19 LAB — BACTERIA UR CULT: ABNORMAL

## 2022-12-14 ENCOUNTER — E-ADVICE (OUTPATIENT)
Dept: OBGYN | Age: 37
End: 2022-12-14

## 2022-12-14 RX ORDER — NITROFURANTOIN MACROCRYSTALS 50 MG/1
50 CAPSULE ORAL 4 TIMES DAILY
Qty: 30 CAPSULE | Refills: 3 | Status: SHIPPED | OUTPATIENT
Start: 2022-12-14 | End: 2023-07-01 | Stop reason: SDUPTHER

## 2023-02-21 ENCOUNTER — WALK IN (OUTPATIENT)
Dept: URGENT CARE | Age: 38
End: 2023-02-21

## 2023-02-21 VITALS
OXYGEN SATURATION: 97 % | DIASTOLIC BLOOD PRESSURE: 74 MMHG | TEMPERATURE: 97.4 F | HEART RATE: 89 BPM | RESPIRATION RATE: 18 BRPM | SYSTOLIC BLOOD PRESSURE: 104 MMHG

## 2023-02-21 DIAGNOSIS — J32.9 SINUSITIS, UNSPECIFIED CHRONICITY, UNSPECIFIED LOCATION: Primary | ICD-10-CM

## 2023-02-21 PROCEDURE — 99214 OFFICE O/P EST MOD 30 MIN: CPT | Performed by: FAMILY MEDICINE

## 2023-02-21 RX ORDER — DEXTROMETHORPHAN HYDROBROMIDE AND PROMETHAZINE HYDROCHLORIDE 15; 6.25 MG/5ML; MG/5ML
5 SYRUP ORAL 4 TIMES DAILY PRN
Qty: 240 ML | Refills: 0 | Status: SHIPPED | OUTPATIENT
Start: 2023-02-21 | End: 2023-03-03

## 2023-02-21 RX ORDER — AMOXICILLIN AND CLAVULANATE POTASSIUM 875; 125 MG/1; MG/1
1 TABLET, FILM COATED ORAL 2 TIMES DAILY
Qty: 20 TABLET | Refills: 0 | Status: SHIPPED | OUTPATIENT
Start: 2023-02-21 | End: 2023-03-03

## 2023-03-05 ENCOUNTER — WALK IN (OUTPATIENT)
Dept: URGENT CARE | Age: 38
End: 2023-03-05

## 2023-03-05 VITALS
HEART RATE: 82 BPM | RESPIRATION RATE: 18 BRPM | DIASTOLIC BLOOD PRESSURE: 60 MMHG | OXYGEN SATURATION: 99 % | TEMPERATURE: 96.9 F | SYSTOLIC BLOOD PRESSURE: 92 MMHG

## 2023-03-05 DIAGNOSIS — R35.0 FREQUENT URINATION: Primary | ICD-10-CM

## 2023-03-05 LAB
APPEARANCE UR: CLEAR
BACTERIA #/AREA URNS HPF: ABNORMAL /HPF
BILIRUB UR QL STRIP: NEGATIVE
COLOR UR: ABNORMAL
GLUCOSE UR STRIP-MCNC: NEGATIVE MG/DL
HGB UR QL STRIP: ABNORMAL
HYALINE CASTS #/AREA URNS LPF: ABNORMAL /LPF
KETONES UR STRIP-MCNC: NEGATIVE MG/DL
LEUKOCYTE ESTERASE UR QL STRIP: ABNORMAL
NITRITE UR QL STRIP: POSITIVE
PH UR STRIP: 7 [PH] (ref 5–7)
PROT UR STRIP-MCNC: NEGATIVE MG/DL
RBC #/AREA URNS HPF: ABNORMAL /HPF
SP GR UR STRIP: <1.005 (ref 1–1.03)
SQUAMOUS #/AREA URNS HPF: ABNORMAL /HPF
UROBILINOGEN UR STRIP-MCNC: 0.2 MG/DL
WBC #/AREA URNS HPF: ABNORMAL /HPF

## 2023-03-05 PROCEDURE — 99214 OFFICE O/P EST MOD 30 MIN: CPT | Performed by: FAMILY MEDICINE

## 2023-03-05 PROCEDURE — 87086 URINE CULTURE/COLONY COUNT: CPT | Performed by: INTERNAL MEDICINE

## 2023-03-05 PROCEDURE — 81001 URINALYSIS AUTO W/SCOPE: CPT | Performed by: INTERNAL MEDICINE

## 2023-03-05 RX ORDER — NITROFURANTOIN 25; 75 MG/1; MG/1
100 CAPSULE ORAL 2 TIMES DAILY
Qty: 10 CAPSULE | Refills: 0 | Status: SHIPPED | OUTPATIENT
Start: 2023-03-05 | End: 2023-03-10

## 2023-03-06 LAB — BACTERIA UR CULT: NORMAL

## 2023-07-01 RX ORDER — NITROFURANTOIN MACROCRYSTALS 50 MG/1
50 CAPSULE ORAL 4 TIMES DAILY
Qty: 30 CAPSULE | Refills: 3 | Status: CANCELLED | OUTPATIENT
Start: 2023-07-01

## 2023-07-03 ENCOUNTER — TELEPHONE (OUTPATIENT)
Dept: OBGYN | Age: 38
End: 2023-07-03

## 2023-07-03 RX ORDER — NITROFURANTOIN MACROCRYSTALS 50 MG/1
50 CAPSULE ORAL 4 TIMES DAILY
Qty: 30 CAPSULE | Refills: 0 | Status: SHIPPED | OUTPATIENT
Start: 2023-07-03 | End: 2023-08-16 | Stop reason: ALTCHOICE

## 2023-07-12 ENCOUNTER — E-ADVICE (OUTPATIENT)
Dept: OBGYN | Age: 38
End: 2023-07-12

## 2023-07-12 ENCOUNTER — TELEPHONE (OUTPATIENT)
Dept: OBGYN | Age: 38
End: 2023-07-12

## 2023-07-12 RX ORDER — PROGESTERONE 100 MG/1
100 CAPSULE ORAL DAILY
Qty: 30 CAPSULE | Refills: 2 | Status: SHIPPED | OUTPATIENT
Start: 2023-07-12

## 2023-08-16 ENCOUNTER — OFFICE VISIT (OUTPATIENT)
Dept: FAMILY MEDICINE | Age: 38
End: 2023-08-16

## 2023-08-16 VITALS
SYSTOLIC BLOOD PRESSURE: 94 MMHG | WEIGHT: 115 LBS | DIASTOLIC BLOOD PRESSURE: 60 MMHG | HEART RATE: 84 BPM | TEMPERATURE: 97.5 F | BODY MASS INDEX: 20.38 KG/M2 | HEIGHT: 63 IN | RESPIRATION RATE: 16 BRPM | OXYGEN SATURATION: 98 %

## 2023-08-16 DIAGNOSIS — Z00.00 ROUTINE GENERAL MEDICAL EXAMINATION AT A HEALTH CARE FACILITY: Primary | ICD-10-CM

## 2023-08-16 PROCEDURE — 99395 PREV VISIT EST AGE 18-39: CPT | Performed by: FAMILY MEDICINE

## 2023-08-16 RX ORDER — NITROFURANTOIN 25; 75 MG/1; MG/1
CAPSULE ORAL
COMMUNITY
Start: 2023-07-27

## 2023-08-16 RX ORDER — FLUTICASONE PROPIONATE AND SALMETEROL 250; 50 UG/1; UG/1
1 POWDER RESPIRATORY (INHALATION)
Qty: 1 EACH | Refills: 0 | Status: SHIPPED | OUTPATIENT
Start: 2023-08-16

## 2023-08-16 RX ORDER — ASPIRIN 81 MG/1
81 TABLET, CHEWABLE ORAL
COMMUNITY

## 2023-08-16 RX ORDER — DIPHENHYDRAMINE HCL 25 MG
25 CAPSULE ORAL
COMMUNITY

## 2023-08-16 ASSESSMENT — PATIENT HEALTH QUESTIONNAIRE - PHQ9
SUM OF ALL RESPONSES TO PHQ9 QUESTIONS 1 AND 2: 0
1. LITTLE INTEREST OR PLEASURE IN DOING THINGS: NOT AT ALL
SUM OF ALL RESPONSES TO PHQ9 QUESTIONS 1 AND 2: 0
2. FEELING DOWN, DEPRESSED OR HOPELESS: NOT AT ALL
CLINICAL INTERPRETATION OF PHQ2 SCORE: NO FURTHER SCREENING NEEDED

## 2023-08-23 ENCOUNTER — TELEPHONE (OUTPATIENT)
Dept: OBGYN | Age: 38
End: 2023-08-23

## 2023-09-08 ENCOUNTER — EXTERNAL LAB (OUTPATIENT)
Dept: HEALTH INFORMATION MANAGEMENT | Facility: OTHER | Age: 38
End: 2023-09-08

## 2023-09-08 LAB — LAB RESULT: NORMAL

## 2023-09-13 ENCOUNTER — OFFICE VISIT (OUTPATIENT)
Dept: DERMATOLOGY | Age: 38
End: 2023-09-13

## 2023-09-13 DIAGNOSIS — Z12.83 SCREENING EXAM FOR SKIN CANCER: ICD-10-CM

## 2023-09-13 DIAGNOSIS — L84 CORN: Primary | ICD-10-CM

## 2023-09-13 PROCEDURE — 99203 OFFICE O/P NEW LOW 30 MIN: CPT | Performed by: DERMATOLOGY

## 2023-10-10 ENCOUNTER — NURSE ONLY (OUTPATIENT)
Dept: PEDIATRICS | Age: 38
End: 2023-10-10

## 2023-10-10 DIAGNOSIS — Z23 NEED FOR VACCINATION: Primary | ICD-10-CM

## 2023-10-10 PROCEDURE — 90471 IMMUNIZATION ADMIN: CPT | Performed by: PEDIATRICS

## 2023-10-10 PROCEDURE — 90686 IIV4 VACC NO PRSV 0.5 ML IM: CPT | Performed by: PEDIATRICS

## 2023-12-06 RX ORDER — FLUTICASONE PROPIONATE AND SALMETEROL 50; 250 UG/1; UG/1
1 POWDER RESPIRATORY (INHALATION) 2 TIMES DAILY
Qty: 3 EACH | Refills: 1 | Status: SHIPPED | OUTPATIENT
Start: 2023-12-06

## 2024-01-24 ENCOUNTER — EXTERNAL LAB (OUTPATIENT)
Dept: HEALTH INFORMATION MANAGEMENT | Facility: OTHER | Age: 39
End: 2024-01-24

## 2024-01-24 LAB — LAB RESULT: NORMAL

## 2024-02-20 ENCOUNTER — E-ADVICE (OUTPATIENT)
Dept: FAMILY MEDICINE | Age: 39
End: 2024-02-20

## 2024-02-26 RX ORDER — FLUTICASONE PROPIONATE AND SALMETEROL 250; 50 UG/1; UG/1
1 POWDER RESPIRATORY (INHALATION) 2 TIMES DAILY
Qty: 3 EACH | Refills: 1 | Status: SHIPPED | OUTPATIENT
Start: 2024-02-26

## 2024-02-28 ENCOUNTER — EXTERNAL RECORD (OUTPATIENT)
Dept: HEALTH INFORMATION MANAGEMENT | Facility: OTHER | Age: 39
End: 2024-02-28

## 2024-06-11 ENCOUNTER — WALK IN (OUTPATIENT)
Dept: URGENT CARE | Age: 39
End: 2024-06-11

## 2024-06-11 VITALS
OXYGEN SATURATION: 98 % | HEART RATE: 74 BPM | RESPIRATION RATE: 16 BRPM | SYSTOLIC BLOOD PRESSURE: 100 MMHG | TEMPERATURE: 97.2 F | DIASTOLIC BLOOD PRESSURE: 60 MMHG

## 2024-06-11 DIAGNOSIS — H10.9 CONJUNCTIVITIS, UNSPECIFIED CONJUNCTIVITIS TYPE, UNSPECIFIED LATERALITY: Primary | ICD-10-CM

## 2024-06-11 PROCEDURE — 99214 OFFICE O/P EST MOD 30 MIN: CPT | Performed by: FAMILY MEDICINE

## 2024-06-11 RX ORDER — TOBRAMYCIN 3 MG/ML
SOLUTION/ DROPS OPHTHALMIC
Qty: 1 EACH | Refills: 0 | Status: SHIPPED | OUTPATIENT
Start: 2024-06-11

## 2024-06-11 ASSESSMENT — ENCOUNTER SYMPTOMS: EYE DISCHARGE: 1

## 2024-07-13 ENCOUNTER — OFFICE VISIT (OUTPATIENT)
Dept: FAMILY MEDICINE | Age: 39
End: 2024-07-13

## 2024-07-13 VITALS
OXYGEN SATURATION: 98 % | TEMPERATURE: 98.2 F | BODY MASS INDEX: 19.99 KG/M2 | HEIGHT: 63 IN | HEART RATE: 103 BPM | WEIGHT: 112.8 LBS | DIASTOLIC BLOOD PRESSURE: 68 MMHG | SYSTOLIC BLOOD PRESSURE: 98 MMHG

## 2024-07-13 DIAGNOSIS — G47.00 INSOMNIA, UNSPECIFIED TYPE: Primary | ICD-10-CM

## 2024-07-13 RX ORDER — ZOLPIDEM TARTRATE 5 MG/1
5 TABLET ORAL NIGHTLY PRN
Qty: 15 TABLET | Refills: 0 | Status: SHIPPED | OUTPATIENT
Start: 2024-07-13

## 2024-10-15 ENCOUNTER — NURSE ONLY (OUTPATIENT)
Dept: PEDIATRICS | Age: 39
End: 2024-10-15

## 2024-10-15 DIAGNOSIS — Z23 NEED FOR VACCINATION: Primary | ICD-10-CM

## 2024-10-15 PROCEDURE — 90656 IIV3 VACC NO PRSV 0.5 ML IM: CPT | Performed by: PEDIATRICS

## 2024-10-15 PROCEDURE — 90471 IMMUNIZATION ADMIN: CPT | Performed by: PEDIATRICS

## 2024-11-19 NOTE — PROGRESS NOTES
Outpatient Maternal-Fetal Medicine Consultation    Dear Dr. Moffett    Thank you for requesting ultrasound evaluation and maternal fetal medicine consultation on your patient Sera Tee.  As you are aware she is a 39 year old female  with a singletonpregnancy and an Estimated Date of Delivery: 25.  A maternal-fetal medicine consultation was requested secondary to advanced maternal age, IVF gestation, and APAS .  Her prenatal records and labs were reviewed.    HISTORY  Past OB History  OB History last reviewed by Subha Keen on 2024     4    Para   1    Term   1            AB   2    Living   1      SAB   2    IAB        Ectopic        Multiple        Live Births   1          # Outcome Date GA Labor/2nd Weight Sex Type Anes PTL Lv A1 A5   1 SAB 2020 12w0d       Living        2 SAB 2020 8w0d               3 Term 10/09/21 39w2d  7 lb 10.4 oz (3.47 kg) M CS-LTranv Epidural N Living 9 9   Name: PHIL TEE   Location: Other   Delivering Clinician: Moshe Martinez DO          Past Medical History  unremarkable    Past Surgical History  C/S    Family History  The patient has no family status information on file.       Medications:   Current Outpatient Medications:     Prenatal Vit w/Ze-Ngjtfnxry-FQ (PNV OR), Take by mouth daily., Disp: , Rfl:     enoxaparin (LOVENOX) 40 MG/0.4ML Injection Solution Prefilled Syringe, Inject 0.4 mL (40 mg total) into the skin daily., Disp: , Rfl:     levothyroxine 25 MCG Oral Tab, Take 1 tablet (25 mcg total) by mouth before breakfast., Disp: , Rfl:     aspirin 81 MG Oral Tab EC, Take 1 tablet (81 mg total) by mouth daily., Disp: , Rfl:   Allergies: Allergies[1]    PHYSICAL EXAMINATION:  /58 (BP Location: Right arm, Patient Position: Sitting, Cuff Size: adult)   Pulse 101   Ht 5' 3\" (1.6 m)   Wt 127 lb (57.6 kg)   BMI 22.50 kg/m²   General: alert and oriented,no acute distress  Abdomen: gravid, soft, non-tender  Extremities:  non-tender, no edema    OBSTETRIC ULTRASOUND  The patient had a level II ultrasound today which revealed size consistent with dates and a normal detailed anatomic survey.      Ultrasound Findings:  Single IUP in breech presentation.    Placenta is left lateral.   A 3 vessel cord is noted.  Cardiac activity is present at 137 bpm   g ( 0 lb 12 oz);   MVP is 5.9 cm .     The fetal measurements are consistent with the established EDC. No ultrasound evidence of structural abnormalities are seen today. The nasal bone is present. No ultrasound evidence of markers for aneuploidy are seen. She understands that ultrasound exam cannot exclude genetic abnormalities and that genetic testing is recommended. The limitations of ultrasound were discussed.     Uterus and adnexa appeared normal  today on US    See PACS/Imaging Tab For Complete Ultrasound Report  I interpreted the results and reviewed them with the patient.    DISCUSSION  During her visit we discussed and reviewed the following issues:  IVF GESTATION  Conception by IVF is associated with an increased incidence of several obstetrical and  complications. Most of these are related to the high incidence of multiple gestations. The precise reasons for this increase in adverse outcomes are not clear.    ART is associated with an up to two-fold increased risk of  birth and low birth weight in [place] pregnancies. ART does not appear to be an independent risk factor for adverse neurodevelopment outcome. ART appears to be at increased risk of delivering offspring with congenital malformations compared with fertile women who conceive naturally. Heart defects have been reported as high at 6 % so fetal echocardiography is recommended in all IVF patients. Stillbirth and  mortality rates appear to be increased as much as four-fold.    [place] ART pregnancies, the relative risk of common pregnancy complications such as fetal growth restriction,  preeclampsia, prematurity and  mortality are increased.    ADVANCED MATERNAL AGE    Background  I reviewed with the patient that pregnancies in women of advanced maternal age (35 or older at delivery) are associated with elevated risks.  Specifically, there is a higher rate of:    Fetal malformations  Preeclampsia  Gestational diabetes  Intrauterine fetal death    As a result, enhanced pregnancy surveillance is advised for these patients including a comprehensive ultrasound to assess for fetal malformations  (at 20 weeks) and a third trimester ultrasound assessment for fetal growth (at 32 weeks).  In addition, weekly NST's (initiating at 36 weeks gestation for women 35-39 years and at 32 weeks gestation for women 40 years and older) are also advised.  Routine obstetric care is more than adequate to assess for gestational diabetes and preeclampsia; hence, no further significant alterations in obstetric care are advised.    Fetal Aneuploidy    We also discussed the increased risk of chromosomal abnormalities associated with advanced maternal age.  I reviewed that an ultrasound examination cannot reliably exclude potential genetic abnormalities. Given that the patient will be 39 years old at the time of delivery I reviewed that her risk (at delivery) of having a  with any chromosome abnormality is 1:66 and with trisomy 21 is 1: 106.    Invasive Testing    I offered invasive genetic testing (amniocentesis, chorionic villus sampling) after reviewing the diagnostic accuracy of these tests as well as the procedure associated loss rate (1:500 for genetic amniocentesis).    She ultimately does not desire invasive genetic testing.     Non-invasive Pregnancy Testing (NIPT)    I reviewed current non-invasive screening options.  Currently non-invasive pregnancy testing (NIPT) offers the highest detection rate (with the lowest false positive rate) for the detection of fetal aneuploidy amongst high-risk patients.   The limitations of detailed mid-trimester sonography was reviewed with the patient.  First trimester screening and second trimester multiple-marker serum serum screening as alternative aneuploidy screening options were also reviewed.  However, both of these tests are associated with lower detection and higher false positive rates.    The patient has already obtained a low-risk PGS result and was appropriately reassured.  Also low-risk NIPT    ANTIPHOSPHOLIPID ANTIBODY SYNDROME  Diagnosed by reproductive endocrinologist        Antiphospholipid syndrome(APS) is an autoimmune disorder.  APS is defined as the presence in the serum of at least one type of autoantibody known as an antiphospholipid antibody (aPL) and  one or more episodes of venous, arterial, or small vessel thrombosis and/or morbidity with pregnancy(Otherwise unexplained death at 10 weeks gestation of a morphologically normal fetus OR One or more premature births before 34 weeks of gestation because of eclampsia, preeclampsia, or placental insufficiency OR - Three or more embryonic (<10 week gestation) pregnancy losses unexplained by maternal or paternal chromosomal abnormalities or maternal anatomic or hormonal causes).          APS is associated with thrombosis, autoimmune thrombocytopenia and early fetal loss.  Pregnancies are frequently complicated with preeclampsia, IUGR, placental insufficiency,  delivery and IUFD.  The most worrisome maternal risk is thrombosis and pregnancy increases that risk to as high as 12%.  Recurrence risk of thrombosis if untreated is 25%.     IMPRESSION:  IUP at 20w3d  AMA: low-risk cell free fetal DNA and  low-risk PGS, declined invasive testing  IVF Gestation  Antiphospholipid antibody syndrome - on lovenox    RECOMMENDATIONS:  Continue care with Dr. Moffett  Fetal echocardiogram at 24 weeks  Monthly growth scans in the third trimester with addition of BPP at 32 weeks  Weekly NST's at 32 weeks.  Twice weekly  testing at 38 weeks - weekly NST and weekly BPP.  Delivery at 39-40 weeks.  Continue thromboprophylaxis with enoxaparin  Transition to unfractionated heparin (5,000 units subcu every 12 hours) at 36 weeks to facilitate placement of neuraxial analgesia in labor  Resume enoxaparin 6-12 hours after vaginal delivery or 12-24 hours after  section and continue it for 6 weeks postpartum.  Calcium 1200mg daily    Thank you for allowing me to participate in the care of your patient.  Please do not hesitate to contact me if additional questions or concerns arise.      Juanjose Hidalgo M.D.    40 minutes spent in review of records, patient consultation, documentation and coordination of care.  The relevant clinical matter(s) are summarized above.     Note to patient and family  The 21st Century Cures Act makes medical notes available to patients in the interest of transparency.  However, please be advised that this is a medical document.  It is intended as [id number] communication.  It is written and medical language may contain abbreviations or verbiage that are technical and unfamiliar.  It may appear blunt or direct.  Medical documents are intended to carry relevant information, facts as evident, and the clinical opinion of the practitioner.         [1] Not on File

## 2024-11-26 ENCOUNTER — ULTRASOUND ENCOUNTER (OUTPATIENT)
Dept: PERINATAL CARE | Facility: HOSPITAL | Age: 39
End: 2024-11-26
Attending: OBSTETRICS & GYNECOLOGY
Payer: COMMERCIAL

## 2024-11-26 VITALS
WEIGHT: 127 LBS | BODY MASS INDEX: 22.5 KG/M2 | HEART RATE: 101 BPM | HEIGHT: 63 IN | SYSTOLIC BLOOD PRESSURE: 102 MMHG | DIASTOLIC BLOOD PRESSURE: 58 MMHG

## 2024-11-26 DIAGNOSIS — O35.9XX0 SUSPECTED FETAL ABNORMALITY AFFECTING MANAGEMENT OF MOTHER (HCC): ICD-10-CM

## 2024-11-26 DIAGNOSIS — O09.529 AMA (ADVANCED MATERNAL AGE) MULTIGRAVIDA 35+ (HCC): ICD-10-CM

## 2024-11-26 DIAGNOSIS — O09.522 MULTIGRAVIDA OF ADVANCED MATERNAL AGE IN SECOND TRIMESTER (HCC): ICD-10-CM

## 2024-11-26 DIAGNOSIS — O09.522 MULTIGRAVIDA OF ADVANCED MATERNAL AGE IN SECOND TRIMESTER (HCC): Primary | ICD-10-CM

## 2024-11-26 DIAGNOSIS — O09.812 PREGNANCY RESULTING FROM ASSISTED REPRODUCTIVE TECHNOLOGY, SECOND TRIMESTER (HCC): ICD-10-CM

## 2024-11-26 PROCEDURE — 76811 OB US DETAILED SNGL FETUS: CPT | Performed by: OBSTETRICS & GYNECOLOGY

## 2024-11-26 RX ORDER — ENOXAPARIN SODIUM 100 MG/ML
40 INJECTION SUBCUTANEOUS DAILY
COMMUNITY

## 2024-11-26 RX ORDER — ASPIRIN 81 MG/1
81 TABLET ORAL DAILY
COMMUNITY

## 2024-11-26 RX ORDER — LEVOTHYROXINE SODIUM 25 UG/1
25 TABLET ORAL
COMMUNITY

## 2024-12-18 ENCOUNTER — TELEPHONE (OUTPATIENT)
Dept: FAMILY MEDICINE | Age: 39
End: 2024-12-18

## 2024-12-27 ENCOUNTER — OFFICE VISIT (OUTPATIENT)
Dept: PERINATAL CARE | Facility: HOSPITAL | Age: 39
End: 2024-12-27
Attending: OBSTETRICS & GYNECOLOGY
Payer: COMMERCIAL

## 2024-12-27 VITALS
DIASTOLIC BLOOD PRESSURE: 59 MMHG | HEIGHT: 63 IN | WEIGHT: 133 LBS | SYSTOLIC BLOOD PRESSURE: 103 MMHG | BODY MASS INDEX: 23.57 KG/M2 | HEART RATE: 101 BPM

## 2024-12-27 DIAGNOSIS — O09.522 MULTIGRAVIDA OF ADVANCED MATERNAL AGE IN SECOND TRIMESTER (HCC): ICD-10-CM

## 2024-12-27 DIAGNOSIS — O09.522 MULTIGRAVIDA OF ADVANCED MATERNAL AGE IN SECOND TRIMESTER (HCC): Primary | ICD-10-CM

## 2024-12-27 DIAGNOSIS — O09.812 PREGNANCY RESULTING FROM ASSISTED REPRODUCTIVE TECHNOLOGY, SECOND TRIMESTER (HCC): ICD-10-CM

## 2024-12-27 PROCEDURE — 76827 ECHO EXAM OF FETAL HEART: CPT

## 2024-12-27 PROCEDURE — 76825 ECHO EXAM OF FETAL HEART: CPT | Performed by: OBSTETRICS & GYNECOLOGY

## 2024-12-27 PROCEDURE — 93325 DOPPLER ECHO COLOR FLOW MAPG: CPT

## 2024-12-27 RX ORDER — AMOXICILLIN 500 MG/1
875 CAPSULE ORAL 2 TIMES DAILY
COMMUNITY

## 2024-12-27 NOTE — PROGRESS NOTES
Outpatient Maternal-Fetal Medicine Follow-Up     Dear Dr. Moffett,     Thank you for requesting ultrasound evaluation and maternal fetal medicine consultation on your patient Sera Echeverria.  As you are aware she is a 39 year old female  with a [place] pregnancy and an Estimated Date of Delivery: 25.  She returned to maternal-fetal medicine today for a follow-up visit.  Her history was reviewed from her prior visit and there were no interval changes.     Antepartum Risk Factors  Advanced maternal age, low risk cell free DNA screen  Pregnancy from assisted reproductive technology  Antiphospholipid syndrome  Levothyroxine is for optimization of IVF not due to hypothyroidism    S: She is tolerating the enoxaparin well.  She is on levothyroxine treatment recommendations from her IVF physician although she was always under the cutoff of 2.5 and her TSH.    PHYSICAL EXAMINATION:  /59 (BP Location: Right arm, Patient Position: Sitting, Cuff Size: adult)   Pulse 101   Ht 5' 3\" (1.6 m)   Wt 133 lb (60.3 kg)   BMI 23.56 kg/m²   General: alert and oriented, no acute distress  Abdomen: gravid, soft, non-tender  Extremities: non-tender, no edema     OBSTETRIC ULTRASOUND  The patient had a fetal echocardiogram ultrasound today which I interpreted the results and reviewed them with the patient.    FETAL ECHOCARDIOGRAM:    Single IUP in , transverse presentation.    Placenta is anterior, left.   A 3 vessel cord is noted.  Cardiac activity is present at 150 bpm  MVP is 4.5 cm .      A transabdominal 2D fetal echocardiogram with Doppler and color mapping was performed. There is a four-chamber heart of appropriate cardiac dimensions. There is situs solitus with levocardia. Intracardiac connection appear to be normal.  The  atrioventricular valves appear normal. There is no evidence of pericardial or pleural effusion. The A-V conduction is one to one and the heart rate is appropriate for gestational age.  No evidence of fetal arrhythmias is seen during today's study. There is a right to left shunting across the patent kade ovale. There is a normal appearance of the aorta and branching pattern of the head vessels.    Normal fetal Doppler interrogations    There appears to be a structurally  normal fetal heart and rhythm. The patient was made aware of the limitations of the fetal heart study: malformations such as but not limiting to minor valve , VSD, anomalous pulmonary venus return, or coarctation of the aorta maybe missed. I discussed the results with the patient.       See imaging tab for the complete US report.     DISCUSSION  During her visit we discussed and reviewed the following issues:  IVF GESTATION  Conception by IVF is associated with an increased incidence of several obstetrical and  complications which were discussed previously and reviewed.  See [facility] note from 2024 for detailed review.     ADVANCED MATERNAL AGE - morbidity and mortality associated with advancing maternal age was discussed previously and reviewed.  See [facility] note from 2024 for detailed review.    I reviewed with the patient that pregnancies in women of advanced maternal age (35 or older at delivery) are associated with elevated risks.  Specifically, there is a higher rate of:     Fetal malformations  Preeclampsia  Gestational diabetes  Intrauterine fetal death     As a result, enhanced pregnancy surveillance is advised for these patients including a comprehensive ultrasound to assess for fetal malformations  (at 20 weeks) and a third trimester ultrasound assessment for fetal growth (at 32 weeks).  In addition, weekly NST's (initiating at 36 weeks gestation for women 35-39 years and at 32 weeks gestation for women 40 years and older) are also advised.  Routine obstetric care is more than adequate to assess for gestational diabetes and preeclampsia; hence, no further significant alterations in obstetric care  are advised.       ANTIPHOSPHOLIPID ANTIBODY SYNDROME  Diagnosed by reproductive endocrinologist        Antiphospholipid syndrome(APS) was discussed previously and reviewed.  See [facility] note from 2024 for detailed review.    We reviewed the signs and symptoms of preeclampsia,  labor and monitoring fetal movement.  We discussed reasons for her to call her physician.    We discussed the recommended plan of care based on her  risk factors.  Sera and her significant other, Cliff, had their questions answered to their satisfaction.       IMPRESSION:  IUP at 24w6d  Normal fetal echocardiogram  AMA: low-risk cell free fetal DNA and  low-risk PGS, declined invasive testing  IVF Gestation  Antiphospholipid antibody syndrome - on lovenox     RECOMMENDATIONS:  Continue care with Dr. Moffett  Monthly growth scans in the third trimester with addition of BPP at 32 weeks  Weekly NST's at 32 weeks.  Twice weekly testing at 38 weeks - weekly NST and weekly BPP.  Delivery at 39-40 weeks.  Continue thromboprophylaxis with enoxaparin  Transition to unfractionated heparin (5,000 units subcu every 12 hours) at 36 weeks to facilitate placement of neuraxial analgesia in labor  Resume enoxaparin 6-12 hours after vaginal delivery or 12-24 hours after  section and continue it for 6 weeks postpartum.  Calcium 1200mg daily     Total time spent 30 minutes this calendar day which includes preparing to see the patient including chart review, obtaining and/or reviewing additional medical history, performing a physical exam and evaluation, documenting clinical information in the electronic medical record, independently interpreting results, counseling the patient, communicating results to the patient/family/caregiver and coordinating care.     Case discussed with patient who demonstrated understanding and agreement with plan.     Thank you for allowing me to participate in the care of this patient.  Please feel free to  contact me with any questions.    Malinda Crowder MD  Maternal-Fetal Medicine       Note to patient and family:  The 21st Century Cures Act makes medical notes available to patients in the interest of transparency.  However, please be advised that this is a medical document.  It is intended as a peer to peer communication.  It is written in medical language and may contain abbreviations or verbiage that are technical and unfamiliar.  It may appear blunt or direct.  Medical documents are intended to carry relevant information, facts as evident, and the clinical opinion of the practitioner.

## 2025-01-21 ENCOUNTER — EXTERNAL LAB (OUTPATIENT)
Dept: HEALTH INFORMATION MANAGEMENT | Facility: OTHER | Age: 40
End: 2025-01-21

## 2025-01-21 LAB
ALBUMIN SERPL-MCNC: 3.4 G/DL (ref 3.9–4.9)
ALP SERPL-CCNC: 78 IU/L (ref 44–121)
ALT SERPL-CCNC: 8 IU/L (ref 0–32)
AST SERPL-CCNC: 16 IU/L (ref 0–40)
BASOPHILS # BLD: 0.1 X10E3/UL (ref 0–0.2)
BASOPHILS NFR BLD: 1 %
BILIRUB SERPL-MCNC: 0.2 MG/DL (ref 0–1.2)
BUN SERPL-MCNC: 7 MG/DL (ref 6–24)
BUN/CREAT SERPL: 12 (ref 9–23)
CALCIUM SERPL-MCNC: 8.1 MG/DL (ref 8.7–10.2)
CHLORIDE SERPL-SCNC: 100 MMOL/L (ref 96–106)
CHOLEST SERPL-MCNC: 210 MG/DL (ref 100–199)
CHOLEST/HDLC SERPL: 3.2 RATIO (ref 0–4.4)
CO2 SERPL-SCNC: 22 MMOL/L (ref 20–29)
CREAT SERPL-MCNC: 0.6 MG/DL (ref 0.57–1)
EOSINOPHIL # BLD: 0.3 X10E3/UL (ref 0–0.4)
EOSINOPHIL NFR BLD: 3 %
ERYTHROCYTE [DISTWIDTH] IN BLOOD: 12 % (ref 11.7–15.4)
GFR SERPLBLD SCHWARTZ-ARVRAT: 116 ML/MIN/1.73
GLOBULIN SER-MCNC: 1.9 G/DL (ref 1.5–4.5)
GLUCOSE SERPL-MCNC: 66 MG/DL (ref 70–99)
HCT VFR BLD CALC: 32.3 % (ref 34–46.6)
HDLC SERPL-MCNC: 66 MG/DL
HGB BLD-MCNC: 10.8 G/DL (ref 11.1–15.9)
IMM GRANULOCYTES NFR BLD: 1 %
LAB RESULT: NORMAL
LDLC SERPL CALC-MCNC: 109 MG/DL (ref 0–129)
LENGTH OF FAST TIME PATIENT: ABNORMAL H
LENGTH OF FAST TIME PATIENT: ABNORMAL H
LYMPHOCYTES # BLD: 2 X10E3/UL (ref 0.7–3.1)
LYMPHOCYTES NFR BLD: 19 %
MCH RBC QN AUTO: 33.8 PG (ref 26.6–33)
MCHC RBC AUTO-ENTMCNC: 33.4 G/DL (ref 31.5–35.7)
MCV RBC AUTO: 101 FL (ref 79–97)
MONOCYTES # BLD: 0.7 X10E3/UL (ref 0.1–0.9)
MONOCYTES NFR BLD: 6 %
NEUTROPHILS # BLD: 7.4 X10E3/UL (ref 1.4–7)
NEUTROPHILS NFR BLD: 70 %
PHOSPHATE SERPL-MCNC: 3.1 MG/DL (ref 3–4.3)
PLATELET # BLD: 239 X10E3/UL (ref 150–450)
POTASSIUM SERPL-SCNC: 3.6 MMOL/L (ref 3.5–5.2)
PROT SERPL-MCNC: 5.3 G/DL (ref 6–8.5)
RBC # BLD: 3.2 X10E6/UL (ref 3.77–5.28)
SODIUM SERPL-SCNC: 134 MMOL/L (ref 134–144)
TRIGL SERPL-MCNC: 206 MG/DL (ref 0–149)
VLDLC SERPL CALC-MCNC: 35 MG/DL (ref 5–40)
WBC # BLD: 10.5 X10E3/UL (ref 3.4–10.8)

## 2025-01-23 NOTE — PROGRESS NOTES
Outpatient Maternal-Fetal Medicine Follow-Up     Dear Dr. Moffett,     Thank you for requesting ultrasound evaluation and maternal fetal medicine consultation on your patient Sera Echeverria.  As you are aware she is a 39 year old female  with a [place] pregnancy and an Estimated Date of Delivery: 25.  She returned to maternal-fetal medicine today for a follow-up visit.  Her history was reviewed from her prior visit and there were no interval changes.    She passed her GDM screen.     Antepartum Risk Factors  Advanced maternal age, low risk cell free DNA screen  Pregnancy from assisted reproductive technology  Antiphospholipid syndrome  Levothyroxine is for optimization of IVF not due to hypothyroidism    S: She is tolerating the enoxaparin well.  She is on levothyroxine treatment recommendations from her IVF physician although she was always under the cutoff of 2.5 and her TSH.    TSH was 0.953 on 25    PHYSICAL EXAMINATION:  /60 (BP Location: Right arm, Patient Position: Sitting, Cuff Size: adult)   Pulse 84   Ht 5' 3\" (1.6 m)   Wt 137 lb (62.1 kg)   BMI 24.27 kg/m²   General: alert and oriented, no acute distress  Abdomen: gravid, soft, non-tender  Extremities: non-tender, no edema     OBSTETRIC ULTRASOUND  The patient had a fetal follow up ultrasound today which I interpreted the results and reviewed them with the patient.    Ultrasound Findings:  Single IUP in cephalic presentation.    Placenta is anterior.   A 3 vessel cord is noted.  Cardiac activity is present at 140 bpm  EFW 1398 g ( 3 lb 1 oz); 61%.    MVP is 4.8 cm . [place] 17.3 cm    The fetal measurements are consistent with established EDC. No gross ultrasound evidence of structural abnormalities are seen today. The patient understands that ultrasound cannot rule out all structural and chromosomal abnormalities.       See imaging tab for the complete US report.     DISCUSSION  During her visit we discussed and reviewed  the following issues:  IVF GESTATION  Conception by IVF is associated with an increased incidence of several obstetrical and  complications which were discussed previously and reviewed.  See MFM note from 2024 for detailed review.     ADVANCED MATERNAL AGE - morbidity and mortality associated with advancing maternal age was discussed previously and reviewed.  See MFM note from 2024 for detailed review.    I reviewed with the patient that pregnancies in women of advanced maternal age (35 or older at delivery) are associated with elevated risks.  Specifically, there is a higher rate of:     Fetal malformations  Preeclampsia  Gestational diabetes  Intrauterine fetal death     As a result, enhanced pregnancy surveillance is advised for these patients including a comprehensive ultrasound to assess for fetal malformations  (at 20 weeks) and a third trimester ultrasound assessment for fetal growth (at 32 weeks).  In addition, weekly NST's (initiating at 36 weeks gestation for women 35-39 years and at 32 weeks gestation for women 40 years and older) are also advised.  Routine obstetric care is more than adequate to assess for gestational diabetes and preeclampsia; hence, no further significant alterations in obstetric care are advised.       ANTIPHOSPHOLIPID ANTIBODY SYNDROME  Diagnosed by reproductive endocrinologist        Antiphospholipid syndrome(APS) was discussed previously and reviewed.  See MFM note from 2024 for detailed review.    We reviewed the signs and symptoms of preeclampsia,  labor and monitoring fetal movement.  We discussed reasons for her to call her physician.    We discussed the recommended plan of care based on her  risk factors.  Sera and her significant other, Cliff, had their questions answered to their satisfaction.       IMPRESSION:  IUP at 28w6d  Normal fetal growth  AMA: low-risk cell free fetal DNA and  low-risk PGS, declined invasive  testing  IVF Gestation  Antiphospholipid antibody syndrome - on lovenox     RECOMMENDATIONS:  Continue care with Dr. Moffett  Monthly growth & BPP ultrasound  Weekly NST's at 32 weeks.  Twice weekly testing at 38 weeks - weekly NST and weekly BPP.  Delivery at 39-40 weeks.  Continue thromboprophylaxis with enoxaparin  Transition to unfractionated heparin (5,000 units subcu every 12 hours) at 37-38 weeks to facilitate placement of neuraxial analgesia for delivery  Resume enoxaparin 6-12 hours after vaginal delivery or 12-24 hours after  section and continue it for 6 weeks postpartum.  Calcium 1200mg daily     Total time spent 30 minutes this calendar day which includes preparing to see the patient including chart review, obtaining and/or reviewing additional medical history, performing a physical exam and evaluation, documenting clinical information in the electronic medical record, independently interpreting results, counseling the patient, communicating results to the patient/family/caregiver and coordinating care.     Case discussed with patient who demonstrated understanding and agreement with plan.     Thank you for allowing me to participate in the care of this patient.  Please feel free to contact me with any questions.    Malinda Crowder MD  Maternal-Fetal Medicine       Note to patient and family:  The 21st Century Cures Act makes medical notes available to patients in the interest of transparency.  However, please be advised that this is a medical document.  It is intended as a peer to peer communication.  It is written in medical language and may contain abbreviations or verbiage that are technical and unfamiliar.  It may appear blunt or direct.  Medical documents are intended to carry relevant information, facts as evident, and the clinical opinion of the practitioner.

## 2025-01-24 ENCOUNTER — OFFICE VISIT (OUTPATIENT)
Dept: PERINATAL CARE | Facility: HOSPITAL | Age: 40
End: 2025-01-24
Attending: OBSTETRICS & GYNECOLOGY
Payer: COMMERCIAL

## 2025-01-24 VITALS
BODY MASS INDEX: 24.27 KG/M2 | HEART RATE: 84 BPM | WEIGHT: 137 LBS | SYSTOLIC BLOOD PRESSURE: 108 MMHG | HEIGHT: 63 IN | DIASTOLIC BLOOD PRESSURE: 60 MMHG

## 2025-01-24 DIAGNOSIS — O09.819 ENCOUNTER FOR SUPERVISION OF PREGNANCY RESULTING FROM ASSISTED REPRODUCTIVE TECHNOLOGY (HCC): Primary | ICD-10-CM

## 2025-01-24 DIAGNOSIS — O09.523 MULTIGRAVIDA OF ADVANCED MATERNAL AGE IN THIRD TRIMESTER (HCC): ICD-10-CM

## 2025-01-24 DIAGNOSIS — D68.61 ANTIPHOSPHOLIPID ANTIBODY SYNDROME COMPLICATING PREGNANCY (HCC): ICD-10-CM

## 2025-01-24 DIAGNOSIS — O09.529 AMA (ADVANCED MATERNAL AGE) MULTIGRAVIDA 35+ (HCC): ICD-10-CM

## 2025-01-24 DIAGNOSIS — O09.819 ENCOUNTER FOR SUPERVISION OF PREGNANCY RESULTING FROM ASSISTED REPRODUCTIVE TECHNOLOGY (HCC): ICD-10-CM

## 2025-01-24 DIAGNOSIS — O99.119 ANTIPHOSPHOLIPID ANTIBODY SYNDROME COMPLICATING PREGNANCY (HCC): ICD-10-CM

## 2025-01-24 PROCEDURE — 76816 OB US FOLLOW-UP PER FETUS: CPT | Performed by: OBSTETRICS & GYNECOLOGY

## 2025-02-15 ENCOUNTER — WALK IN (OUTPATIENT)
Dept: URGENT CARE | Age: 40
End: 2025-02-15

## 2025-02-15 VITALS
OXYGEN SATURATION: 96 % | HEART RATE: 102 BPM | RESPIRATION RATE: 18 BRPM | DIASTOLIC BLOOD PRESSURE: 64 MMHG | TEMPERATURE: 98.8 F | SYSTOLIC BLOOD PRESSURE: 108 MMHG

## 2025-02-15 DIAGNOSIS — J11.1 FLU: ICD-10-CM

## 2025-02-15 DIAGNOSIS — R05.1 ACUTE COUGH: Primary | ICD-10-CM

## 2025-02-15 LAB
FLUAV AG UPPER RESP QL IA.RAPID: POSITIVE
FLUBV AG UPPER RESP QL IA.RAPID: NEGATIVE
SARS-COV+SARS-COV-2 AG RESP QL IA.RAPID: NOT DETECTED
TEST LOT EXPIRATION DATE: ABNORMAL
TEST LOT NUMBER: ABNORMAL

## 2025-02-15 RX ORDER — ALBUTEROL SULFATE 90 UG/1
INHALANT RESPIRATORY (INHALATION)
Qty: 1 EACH | Refills: 0 | Status: SHIPPED | OUTPATIENT
Start: 2025-02-15

## 2025-02-15 RX ORDER — OSELTAMIVIR PHOSPHATE 75 MG/1
75 CAPSULE ORAL 2 TIMES DAILY
Qty: 10 CAPSULE | Refills: 0 | Status: SHIPPED | OUTPATIENT
Start: 2025-02-15 | End: 2025-02-20

## 2025-02-15 RX ORDER — ASPIRIN 81 MG/1
1 TABLET ORAL DAILY
COMMUNITY

## 2025-02-15 RX ORDER — LEVOTHYROXINE SODIUM 25 UG/1
25 TABLET ORAL
COMMUNITY
Start: 2025-01-14 | End: 2026-01-15

## 2025-02-15 RX ORDER — ENOXAPARIN SODIUM 100 MG/ML
40 INJECTION SUBCUTANEOUS
COMMUNITY
Start: 2025-01-14 | End: 2025-03-20

## 2025-02-15 ASSESSMENT — PAIN SCALES - GENERAL: PAINLEVEL: 0

## 2025-02-15 ASSESSMENT — ENCOUNTER SYMPTOMS: COUGH: 1

## 2025-02-21 ENCOUNTER — OFFICE VISIT (OUTPATIENT)
Dept: PERINATAL CARE | Facility: HOSPITAL | Age: 40
End: 2025-02-21
Attending: OBSTETRICS & GYNECOLOGY
Payer: COMMERCIAL

## 2025-02-21 VITALS
HEIGHT: 63 IN | HEART RATE: 92 BPM | BODY MASS INDEX: 24.63 KG/M2 | SYSTOLIC BLOOD PRESSURE: 111 MMHG | WEIGHT: 139 LBS | DIASTOLIC BLOOD PRESSURE: 69 MMHG

## 2025-02-21 DIAGNOSIS — O09.819 ENCOUNTER FOR SUPERVISION OF PREGNANCY RESULTING FROM ASSISTED REPRODUCTIVE TECHNOLOGY (HCC): ICD-10-CM

## 2025-02-21 DIAGNOSIS — D68.61 ANTIPHOSPHOLIPID ANTIBODY SYNDROME COMPLICATING PREGNANCY (HCC): ICD-10-CM

## 2025-02-21 DIAGNOSIS — O99.119 ANTIPHOSPHOLIPID ANTIBODY SYNDROME COMPLICATING PREGNANCY (HCC): ICD-10-CM

## 2025-02-21 DIAGNOSIS — O09.523 MULTIGRAVIDA OF ADVANCED MATERNAL AGE IN THIRD TRIMESTER (HCC): Primary | ICD-10-CM

## 2025-02-21 DIAGNOSIS — O09.523 MULTIGRAVIDA OF ADVANCED MATERNAL AGE IN THIRD TRIMESTER (HCC): ICD-10-CM

## 2025-02-21 DIAGNOSIS — O09.529 AMA (ADVANCED MATERNAL AGE) MULTIGRAVIDA 35+ (HCC): Primary | ICD-10-CM

## 2025-02-21 DIAGNOSIS — O09.529 AMA (ADVANCED MATERNAL AGE) MULTIGRAVIDA 35+ (HCC): ICD-10-CM

## 2025-02-21 PROCEDURE — 76816 OB US FOLLOW-UP PER FETUS: CPT | Performed by: OBSTETRICS & GYNECOLOGY

## 2025-02-21 NOTE — PROGRESS NOTES
Outpatient Maternal-Fetal Medicine Follow-Up     Dear Dr. Moffett,     Thank you for requesting ultrasound evaluation and maternal fetal medicine consultation on your patient Sera Echeverria.  As you are aware she is a 39 year old female  with a [place] pregnancy and an Estimated Date of Delivery: 25.  She returned to maternal-fetal medicine today for a follow-up visit.  Her history was reviewed from her prior visit and there were no interval changes.    She passed her GDM screen.     Antepartum Risk Factors  Advanced maternal age, low risk cell free DNA screen  Pregnancy from assisted reproductive technology  Antiphospholipid syndrome  Levothyroxine is for optimization of IVF not due to hypothyroidism    S: She reports good fetal movement.  No change in medications.    She recently had influenza A and was treated with Tamiflu.    PHYSICAL EXAMINATION:  /69 (BP Location: Right arm, Patient Position: Sitting, Cuff Size: adult)   Pulse 92   Ht 5' 3\" (1.6 m)   Wt 139 lb (63 kg)   BMI 24.62 kg/m²   General: alert and oriented, no acute distress  Abdomen: gravid, soft, non-tender  Extremities: non-tender, no edema     OBSTETRIC ULTRASOUND  The patient had a fetal follow up ultrasound today which I interpreted the results and reviewed them with the patient.    Ultrasound Findings:  Single IUP in cephalic presentation.    Placenta is fundal.   A 3 vessel cord is noted.  Cardiac activity is present at 124 bpm  EFW 2164 g ( 4 lb 12 oz); 53%.    LONDON is  8.1 cm.  MVP is 3.1 cm  BPP is 8/8.     The fetal measurements are consistent with established EDC. No gross ultrasound evidence of structural abnormalities are seen today. The patient understands that ultrasound cannot rule out all structural and chromosomal abnormalities.       See imaging tab for the complete US report.     DISCUSSION  During her visit we discussed and reviewed the following issues:  IVF GESTATION  Conception by IVF is  associated with an increased incidence of several obstetrical and  complications which were discussed previously and reviewed.  See MFM note from 2024 for detailed review.     ADVANCED MATERNAL AGE - morbidity and mortality associated with advancing maternal age was discussed previously and reviewed.  See MFM note from 2024 for detailed review.     ANTIPHOSPHOLIPID ANTIBODY SYNDROME  Diagnosed by reproductive endocrinologist        Antiphospholipid syndrome(APS) was discussed previously and reviewed.  See MFM note from 2024 for detailed review.    We reviewed the signs and symptoms of preeclampsia,  labor and monitoring fetal movement.  We discussed reasons for her to call her physician.    We discussed the recommended plan of care based on her  risk factors.  Sera had her questions answered to her satisfaction.       IMPRESSION:  IUP at 32w6d  Normal fetal growth &  testing  AMA: low-risk cell free fetal DNA and  low-risk PGS, declined invasive testing  IVF Gestation  Antiphospholipid antibody syndrome - on lovenox     RECOMMENDATIONS:  Continue care with Dr. Moffett  Monthly growth & BPP ultrasound  Weekly NST  Twice weekly testing at 38 weeks - weekly NST and weekly BPP.  Delivery at 39-40 weeks.  Continue thromboprophylaxis with enoxaparin  Transition to unfractionated heparin (5,000 units subcu every 12 hours) at 37-38 weeks to facilitate placement of neuraxial analgesia for delivery  Resume enoxaparin 6-12 hours after vaginal delivery or 12-24 hours after  section and continue it for 6 weeks postpartum.  Calcium 1200mg daily     Total time spent 20 minutes this calendar day which includes preparing to see the patient including chart review, obtaining and/or reviewing additional medical history, performing a physical exam and evaluation, documenting clinical information in the electronic medical record, independently interpreting results,  counseling the patient, communicating results to the patient/family/caregiver and coordinating care.     Case discussed with patient who demonstrated understanding and agreement with plan.     Thank you for allowing me to participate in the care of this patient.  Please feel free to contact me with any questions.    Malinda Crowder MD  Maternal-Fetal Medicine       Note to patient and family:  The 21st Century Cures Act makes medical notes available to patients in the interest of transparency.  However, please be advised that this is a medical document.  It is intended as a peer to peer communication.  It is written in medical language and may contain abbreviations or verbiage that are technical and unfamiliar.  It may appear blunt or direct.  Medical documents are intended to carry relevant information, facts as evident, and the clinical opinion of the practitioner.

## 2025-03-14 ENCOUNTER — TELEPHONE (OUTPATIENT)
Dept: FAMILY MEDICINE | Age: 40
End: 2025-03-14

## 2025-03-14 SDOH — ECONOMIC STABILITY: HOUSING INSECURITY: WHAT IS YOUR LIVING SITUATION TODAY?: I HAVE A STEADY PLACE TO LIVE

## 2025-03-14 SDOH — ECONOMIC STABILITY: TRANSPORTATION INSECURITY
IN THE PAST 12 MONTHS, HAS LACK OF RELIABLE TRANSPORTATION KEPT YOU FROM MEDICAL APPOINTMENTS, MEETINGS, WORK OR FROM GETTING THINGS NEEDED FOR DAILY LIVING?: NO

## 2025-03-14 SDOH — ECONOMIC STABILITY: FOOD INSECURITY: WITHIN THE PAST 12 MONTHS, THE FOOD YOU BOUGHT JUST DIDN'T LAST AND YOU DIDN'T HAVE MONEY TO GET MORE.: NEVER TRUE

## 2025-03-14 SDOH — ECONOMIC STABILITY: HOUSING INSECURITY: DO YOU HAVE PROBLEMS WITH ANY OF THE FOLLOWING?: NONE OF THE ABOVE

## 2025-03-14 SDOH — ECONOMIC STABILITY: GENERAL: WOULD YOU LIKE HELP WITH ANY OF THE FOLLOWING NEEDS?: I DON'T WANT HELP WITH ANY OF THESE

## 2025-03-14 ASSESSMENT — SOCIAL DETERMINANTS OF HEALTH (SDOH): IN THE PAST 12 MONTHS, HAS THE ELECTRIC, GAS, OIL, OR WATER COMPANY THREATENED TO SHUT OFF SERVICE IN YOUR HOME?: NO

## 2025-03-21 ENCOUNTER — APPOINTMENT (OUTPATIENT)
Dept: FAMILY MEDICINE | Age: 40
End: 2025-03-21

## 2025-03-21 VITALS
BODY MASS INDEX: 19.98 KG/M2 | SYSTOLIC BLOOD PRESSURE: 98 MMHG | HEART RATE: 86 BPM | DIASTOLIC BLOOD PRESSURE: 62 MMHG | HEIGHT: 63 IN

## 2025-03-21 DIAGNOSIS — E03.9 HYPOTHYROIDISM, UNSPECIFIED TYPE: Primary | ICD-10-CM

## 2025-03-21 DIAGNOSIS — N20.0 KIDNEY STONE: ICD-10-CM

## 2025-03-21 DIAGNOSIS — Z80.3 FAMILY HISTORY OF MALIGNANT NEOPLASM OF BREAST: ICD-10-CM

## 2025-03-21 DIAGNOSIS — R76.0 ANTIPHOSPHOLIPID ANTIBODY POSITIVE: ICD-10-CM

## 2025-03-21 DIAGNOSIS — Z00.00 ROUTINE GENERAL MEDICAL EXAMINATION AT A HEALTH CARE FACILITY: ICD-10-CM

## 2025-03-21 DIAGNOSIS — Z83.49 FAMILY HISTORY OF THYROID DISEASE: ICD-10-CM

## 2025-03-21 DIAGNOSIS — R12 HEARTBURN: ICD-10-CM

## 2025-03-21 DIAGNOSIS — Z34.93 THIRD TRIMESTER PREGNANCY (CMD): ICD-10-CM

## 2025-03-21 PROBLEM — O09.513 PRIMIGRAVIDA OF ADVANCED MATERNAL AGE IN THIRD TRIMESTER (CMD): Status: RESOLVED | Noted: 2021-08-17 | Resolved: 2025-03-21

## 2025-03-21 PROBLEM — O02.1 MISSED ABORTION (CMD): Status: RESOLVED | Noted: 2020-01-17 | Resolved: 2025-03-21

## 2025-03-21 PROBLEM — Z28.21 REFUSED INFLUENZA VACCINE: Status: RESOLVED | Noted: 2020-12-31 | Resolved: 2025-03-21

## 2025-03-21 PROBLEM — N96 HISTORY OF RECURRENT MISCARRIAGES, NOT CURRENTLY PREGNANT: Status: RESOLVED | Noted: 2020-09-24 | Resolved: 2025-03-21

## 2025-03-21 RX ORDER — HEPARIN SODIUM 10000 [USP'U]/ML
5000 INJECTION, SOLUTION INTRAVENOUS; SUBCUTANEOUS
COMMUNITY
Start: 2025-03-17

## 2025-03-21 RX ORDER — SYRINGE-NEEDLE,INSULIN,0.5 ML 29 G X1/2"
SYRINGE, EMPTY DISPOSABLE MISCELLANEOUS
COMMUNITY
Start: 2025-03-20

## 2025-03-21 ASSESSMENT — PATIENT HEALTH QUESTIONNAIRE - PHQ9
CLINICAL INTERPRETATION OF PHQ2 SCORE: NO FURTHER SCREENING NEEDED
CLINICAL INTERPRETATION OF PHQ2 SCORE: NO FURTHER SCREENING NEEDED
SUM OF ALL RESPONSES TO PHQ9 QUESTIONS 1 AND 2: 0
2. FEELING DOWN, DEPRESSED OR HOPELESS: NOT AT ALL
1. LITTLE INTEREST OR PLEASURE IN DOING THINGS: NOT AT ALL
SUM OF ALL RESPONSES TO PHQ9 QUESTIONS 1 AND 2: 0
SUM OF ALL RESPONSES TO PHQ9 QUESTIONS 1 AND 2: 0
1. LITTLE INTEREST OR PLEASURE IN DOING THINGS: NOT AT ALL
SUM OF ALL RESPONSES TO PHQ9 QUESTIONS 1 AND 2: 0
2. FEELING DOWN, DEPRESSED OR HOPELESS: NOT AT ALL

## 2025-03-24 ENCOUNTER — ULTRASOUND ENCOUNTER (OUTPATIENT)
Dept: PERINATAL CARE | Facility: HOSPITAL | Age: 40
End: 2025-03-24
Attending: OBSTETRICS & GYNECOLOGY
Payer: COMMERCIAL

## 2025-03-24 ENCOUNTER — TELEPHONE (OUTPATIENT)
Dept: OBGYN UNIT | Facility: HOSPITAL | Age: 40
End: 2025-03-24

## 2025-03-24 VITALS
WEIGHT: 143 LBS | HEIGHT: 63 IN | SYSTOLIC BLOOD PRESSURE: 104 MMHG | DIASTOLIC BLOOD PRESSURE: 69 MMHG | HEART RATE: 86 BPM | BODY MASS INDEX: 25.34 KG/M2

## 2025-03-24 DIAGNOSIS — O09.812 PREGNANCY RESULTING FROM ASSISTED REPRODUCTIVE TECHNOLOGY, SECOND TRIMESTER (HCC): ICD-10-CM

## 2025-03-24 DIAGNOSIS — O09.523 MULTIGRAVIDA OF ADVANCED MATERNAL AGE IN THIRD TRIMESTER (HCC): Primary | ICD-10-CM

## 2025-03-24 DIAGNOSIS — D68.61 ANTIPHOSPHOLIPID SYNDROME (HCC): ICD-10-CM

## 2025-03-24 DIAGNOSIS — O09.523 MULTIGRAVIDA OF ADVANCED MATERNAL AGE IN THIRD TRIMESTER (HCC): ICD-10-CM

## 2025-03-24 PROCEDURE — 76816 OB US FOLLOW-UP PER FETUS: CPT | Performed by: OBSTETRICS & GYNECOLOGY

## 2025-03-24 PROCEDURE — 76819 FETAL BIOPHYS PROFIL W/O NST: CPT

## 2025-03-24 NOTE — PROGRESS NOTES
Outpatient Maternal-Fetal Medicine Follow-Up     Dear Dr. Moffett,     Thank you for requesting ultrasound evaluation and maternal fetal medicine consultation on your patient Sera Echeverria.  As you are aware she is a 39 year old female  with a [place] pregnancy and an Estimated Date of Delivery: 25.  She returned to maternal-fetal medicine today for a follow-up visit.  Her history was reviewed from her prior visit and there were no interval changes.    She passed her GDM screen.     Antepartum Risk Factors  Advanced maternal age, low risk cell free DNA screen  Pregnancy from assisted reproductive technology  Antiphospholipid syndrome  Levothyroxine is for optimization of IVF not due to hypothyroidism    S: She reports good fetal movement.  No change in medications.        PHYSICAL EXAMINATION:  /69 (BP Location: Right arm, Patient Position: Sitting, Cuff Size: adult)   Pulse 86   Ht 5' 3\" (1.6 m)   Wt 143 lb (64.9 kg)   BMI 25.33 kg/m²    General: alert and oriented, no acute distress  Abdomen: gravid, soft, non-tender  Extremities: non-tender, no edema          DISCUSSION  During her visit we discussed and reviewed the following issues:  IVF GESTATION  Conception by IVF is associated with an increased incidence of several obstetrical and  complications which were discussed previously and reviewed.  See [facility] note from 2024 for detailed review.     ADVANCED MATERNAL AGE - morbidity and mortality associated with advancing maternal age was discussed previously and reviewed.  See [facility] note from 2024 for detailed review.     ANTIPHOSPHOLIPID ANTIBODY SYNDROME  Diagnosed by reproductive endocrinologist        Antiphospholipid syndrome(APS) was discussed previously and reviewed.  See [facility] note from 2024 for detailed review.    We reviewed the signs and symptoms of preeclampsia,  labor and monitoring fetal movement.  We discussed reasons for her to call  her physician.    We discussed the recommended plan of care based on her  risk factors.  Sera had her questions answered to her satisfaction.       OB ULTRASOUND REPORT   See imaging tab for complete ultrasound report or in PACS    Single IUP in cephalic presentation.    Placenta is anterior, left.   Cardiac activity is present at 136 bpm  EFW 3287 g ( 7 lb 4 oz); 64%.    LONDON is  20.5 cm.  MVP is 5.9 cm      BIOPHYSICAL PROFILE:  Movement:    2/2  Tone:            2/2  Breathin/2  Fluid:             2/2  TOTAL:             IMPRESSION:  IUP at 37w2d   Normal fetal growth &  testing  AMA: low-risk cell free fetal DNA and  low-risk PGS, declined invasive testing  IVF Gestation  Antiphospholipid antibody syndrome - on lovenox     RECOMMENDATIONS:  Continue care with Dr. Moffett  Weekly NST  Twice weekly testing at 38 weeks - weekly NST and weekly BPP.  Delivery at 39 weeks.  Continue thromboprophylaxis with enoxaparin  Transition to unfractionated heparin (5,000 units subcu every 12 hours) at 37-38 weeks to facilitate placement of neuraxial analgesia for delivery  Resume enoxaparin 6-12 hours after vaginal delivery or 12-24 hours after  section and continue it for 6 weeks postpartum.  Calcium 1200mg daily     Total time spent 20 minutes this calendar day which includes preparing to see the patient including chart review, obtaining and/or reviewing additional medical history, performing a physical exam and evaluation, documenting clinical information in the electronic medical record, independently interpreting results, counseling the patient, communicating results to the patient/family/caregiver and coordinating care.     Case discussed with patient who demonstrated understanding and agreement with plan.     Thank you for allowing me to participate in the care of this patient.  Please feel free to contact me with any questions.    Karlos Appiah D.O.  Maternal Fetal Medicine         Note to patient and family:  The 21st Century Cures Act makes medical notes available to patients in the interest of transparency.  However, please be advised that this is a medical document.  It is intended as a peer to peer communication.  It is written in medical language and may contain abbreviations or verbiage that are technical and unfamiliar.  It may appear blunt or direct.  Medical documents are intended to carry relevant information, facts as evident, and the clinical opinion of the practitioner.

## 2025-03-25 ENCOUNTER — TELEPHONE (OUTPATIENT)
Dept: OBGYN UNIT | Facility: HOSPITAL | Age: 40
End: 2025-03-25

## 2025-04-01 ENCOUNTER — HOSPITAL ENCOUNTER (INPATIENT)
Facility: HOSPITAL | Age: 40
LOS: 2 days | Discharge: HOME | End: 2025-04-04
Attending: OBSTETRICS & GYNECOLOGY | Admitting: OBSTETRICS & GYNECOLOGY
Payer: COMMERCIAL

## 2025-04-01 ENCOUNTER — ANESTHESIA EVENT (OUTPATIENT)
Dept: OBGYN UNIT | Facility: HOSPITAL | Age: 40
End: 2025-04-01
Payer: COMMERCIAL

## 2025-04-01 PROBLEM — Z34.90 PREGNANCY (HCC): Status: ACTIVE | Noted: 2025-04-01

## 2025-04-01 LAB
ANTIBODY SCREEN: POSITIVE
BASOPHILS # BLD AUTO: 0.04 X10(3) UL (ref 0–0.2)
BASOPHILS NFR BLD AUTO: 0.5 %
EOSINOPHIL # BLD AUTO: 0.13 X10(3) UL (ref 0–0.7)
EOSINOPHIL NFR BLD AUTO: 1.6 %
ERYTHROCYTE [DISTWIDTH] IN BLOOD BY AUTOMATED COUNT: 12.1 %
HCT VFR BLD AUTO: 33.1 %
HGB BLD-MCNC: 11.6 G/DL
IMM GRANULOCYTES # BLD AUTO: 0.13 X10(3) UL (ref 0–1)
IMM GRANULOCYTES NFR BLD: 1.6 %
LYMPHOCYTES # BLD AUTO: 1.67 X10(3) UL (ref 1–4)
LYMPHOCYTES NFR BLD AUTO: 20.4 %
MCH RBC QN AUTO: 33.9 PG (ref 26–34)
MCHC RBC AUTO-ENTMCNC: 35 G/DL (ref 31–37)
MCV RBC AUTO: 96.8 FL
MONOCYTES # BLD AUTO: 0.62 X10(3) UL (ref 0.1–1)
MONOCYTES NFR BLD AUTO: 7.6 %
NEUTROPHILS # BLD AUTO: 5.6 X10 (3) UL (ref 1.5–7.7)
NEUTROPHILS # BLD AUTO: 5.6 X10(3) UL (ref 1.5–7.7)
NEUTROPHILS NFR BLD AUTO: 68.3 %
PLATELET # BLD AUTO: 158 10(3)UL (ref 150–450)
RBC # BLD AUTO: 3.42 X10(6)UL
RH BLOOD TYPE: NEGATIVE
T PALLIDUM AB SER QL IA: NONREACTIVE
WBC # BLD AUTO: 8.2 X10(3) UL (ref 4–11)

## 2025-04-01 RX ORDER — SODIUM CHLORIDE, SODIUM LACTATE, POTASSIUM CHLORIDE, CALCIUM CHLORIDE 600; 310; 30; 20 MG/100ML; MG/100ML; MG/100ML; MG/100ML
125 INJECTION, SOLUTION INTRAVENOUS CONTINUOUS
Status: DISCONTINUED | OUTPATIENT
Start: 2025-04-01 | End: 2025-04-02 | Stop reason: HOSPADM

## 2025-04-01 RX ORDER — CITRIC ACID/SODIUM CITRATE 334-500MG
30 SOLUTION, ORAL ORAL ONCE
Status: COMPLETED | OUTPATIENT
Start: 2025-04-02 | End: 2025-04-02

## 2025-04-01 RX ORDER — HEPARIN SODIUM 10000 [USP'U]/ML
0.5 INJECTION, SOLUTION INTRAVENOUS; SUBCUTANEOUS 2 TIMES DAILY
COMMUNITY
End: 2025-04-04

## 2025-04-01 RX ORDER — ONDANSETRON 2 MG/ML
4 INJECTION INTRAMUSCULAR; INTRAVENOUS EVERY 6 HOURS PRN
Status: DISCONTINUED | OUTPATIENT
Start: 2025-04-01 | End: 2025-04-02 | Stop reason: HOSPADM

## 2025-04-01 RX ORDER — ACETAMINOPHEN 500 MG
1000 TABLET ORAL ONCE
Status: COMPLETED | OUTPATIENT
Start: 2025-04-02 | End: 2025-04-02

## 2025-04-02 ENCOUNTER — ANESTHESIA (OUTPATIENT)
Dept: OBGYN UNIT | Facility: HOSPITAL | Age: 40
End: 2025-04-02
Payer: COMMERCIAL

## 2025-04-02 LAB — RH BLOOD TYPE: NEGATIVE

## 2025-04-02 PROCEDURE — 59514 CESAREAN DELIVERY ONLY: CPT | Performed by: OBSTETRICS & GYNECOLOGY

## 2025-04-02 RX ORDER — NALBUPHINE HYDROCHLORIDE 10 MG/ML
2.5 INJECTION INTRAMUSCULAR; INTRAVENOUS; SUBCUTANEOUS
Status: DISCONTINUED | OUTPATIENT
Start: 2025-04-02 | End: 2025-04-02 | Stop reason: HOSPADM

## 2025-04-02 RX ORDER — IBUPROFEN 600 MG/1
600 TABLET, FILM COATED ORAL EVERY 6 HOURS
Status: DISCONTINUED | OUTPATIENT
Start: 2025-04-03 | End: 2025-04-04

## 2025-04-02 RX ORDER — METOCLOPRAMIDE HYDROCHLORIDE 5 MG/ML
10 INJECTION INTRAMUSCULAR; INTRAVENOUS EVERY 6 HOURS PRN
Status: DISCONTINUED | OUTPATIENT
Start: 2025-04-02 | End: 2025-04-04

## 2025-04-02 RX ORDER — NALOXONE HYDROCHLORIDE 0.4 MG/ML
0.08 INJECTION, SOLUTION INTRAMUSCULAR; INTRAVENOUS; SUBCUTANEOUS
Status: ACTIVE | OUTPATIENT
Start: 2025-04-02 | End: 2025-04-03

## 2025-04-02 RX ORDER — DEXAMETHASONE SODIUM PHOSPHATE 4 MG/ML
VIAL (ML) INJECTION AS NEEDED
Status: DISCONTINUED | OUTPATIENT
Start: 2025-04-02 | End: 2025-04-02 | Stop reason: SURG

## 2025-04-02 RX ORDER — DOCUSATE SODIUM 100 MG/1
100 CAPSULE, LIQUID FILLED ORAL
Status: DISCONTINUED | OUTPATIENT
Start: 2025-04-02 | End: 2025-04-04

## 2025-04-02 RX ORDER — DIPHENHYDRAMINE HYDROCHLORIDE 50 MG/ML
12.5 INJECTION, SOLUTION INTRAMUSCULAR; INTRAVENOUS EVERY 4 HOURS PRN
Status: DISCONTINUED | OUTPATIENT
Start: 2025-04-02 | End: 2025-04-04

## 2025-04-02 RX ORDER — BUPIVACAINE HYDROCHLORIDE 7.5 MG/ML
INJECTION, SOLUTION INTRASPINAL AS NEEDED
Status: DISCONTINUED | OUTPATIENT
Start: 2025-04-02 | End: 2025-04-02 | Stop reason: SURG

## 2025-04-02 RX ORDER — PHENYLEPHRINE HCL 10 MG/ML
VIAL (ML) INJECTION AS NEEDED
Status: DISCONTINUED | OUTPATIENT
Start: 2025-04-02 | End: 2025-04-02 | Stop reason: SURG

## 2025-04-02 RX ORDER — ONDANSETRON 2 MG/ML
4 INJECTION INTRAMUSCULAR; INTRAVENOUS EVERY 6 HOURS PRN
Status: DISCONTINUED | OUTPATIENT
Start: 2025-04-02 | End: 2025-04-04

## 2025-04-02 RX ORDER — LEVOTHYROXINE SODIUM 25 UG/1
25 TABLET ORAL
Status: DISCONTINUED | OUTPATIENT
Start: 2025-04-02 | End: 2025-04-04

## 2025-04-02 RX ORDER — ONDANSETRON 2 MG/ML
INJECTION INTRAMUSCULAR; INTRAVENOUS AS NEEDED
Status: DISCONTINUED | OUTPATIENT
Start: 2025-04-02 | End: 2025-04-02 | Stop reason: SURG

## 2025-04-02 RX ORDER — KETOROLAC TROMETHAMINE 30 MG/ML
30 INJECTION, SOLUTION INTRAMUSCULAR; INTRAVENOUS EVERY 6 HOURS
Status: DISPENSED | OUTPATIENT
Start: 2025-04-02 | End: 2025-04-02

## 2025-04-02 RX ORDER — KETOROLAC TROMETHAMINE 30 MG/ML
30 INJECTION, SOLUTION INTRAMUSCULAR; INTRAVENOUS ONCE
Status: COMPLETED | OUTPATIENT
Start: 2025-04-02 | End: 2025-04-02

## 2025-04-02 RX ORDER — MORPHINE SULFATE 2 MG/ML
INJECTION, SOLUTION INTRAMUSCULAR; INTRAVENOUS AS NEEDED
Status: DISCONTINUED | OUTPATIENT
Start: 2025-04-02 | End: 2025-04-02 | Stop reason: SURG

## 2025-04-02 RX ORDER — ONDANSETRON 2 MG/ML
4 INJECTION INTRAMUSCULAR; INTRAVENOUS ONCE AS NEEDED
Status: DISCONTINUED | OUTPATIENT
Start: 2025-04-02 | End: 2025-04-02 | Stop reason: HOSPADM

## 2025-04-02 RX ORDER — SIMETHICONE 80 MG
80 TABLET,CHEWABLE ORAL 3 TIMES DAILY PRN
Status: DISCONTINUED | OUTPATIENT
Start: 2025-04-02 | End: 2025-04-04

## 2025-04-02 RX ORDER — SODIUM CHLORIDE, SODIUM LACTATE, POTASSIUM CHLORIDE, CALCIUM CHLORIDE 600; 310; 30; 20 MG/100ML; MG/100ML; MG/100ML; MG/100ML
INJECTION, SOLUTION INTRAVENOUS CONTINUOUS PRN
Status: DISCONTINUED | OUTPATIENT
Start: 2025-04-02 | End: 2025-04-02 | Stop reason: SURG

## 2025-04-02 RX ORDER — KETOROLAC TROMETHAMINE 30 MG/ML
INJECTION, SOLUTION INTRAMUSCULAR; INTRAVENOUS
Status: COMPLETED
Start: 2025-04-02 | End: 2025-04-02

## 2025-04-02 RX ORDER — NALBUPHINE HYDROCHLORIDE 10 MG/ML
2.5 INJECTION INTRAMUSCULAR; INTRAVENOUS; SUBCUTANEOUS EVERY 4 HOURS PRN
Status: DISCONTINUED | OUTPATIENT
Start: 2025-04-02 | End: 2025-04-04

## 2025-04-02 RX ORDER — GABAPENTIN 300 MG/1
300 CAPSULE ORAL EVERY 8 HOURS PRN
Status: DISCONTINUED | OUTPATIENT
Start: 2025-04-02 | End: 2025-04-04

## 2025-04-02 RX ORDER — ENOXAPARIN SODIUM 100 MG/ML
40 INJECTION SUBCUTANEOUS DAILY
Status: DISCONTINUED | OUTPATIENT
Start: 2025-04-02 | End: 2025-04-04

## 2025-04-02 RX ORDER — ACETAMINOPHEN 500 MG
1000 TABLET ORAL EVERY 6 HOURS
Status: DISCONTINUED | OUTPATIENT
Start: 2025-04-02 | End: 2025-04-04

## 2025-04-02 RX ORDER — BISACODYL 10 MG
10 SUPPOSITORY, RECTAL RECTAL ONCE AS NEEDED
Status: DISCONTINUED | OUTPATIENT
Start: 2025-04-02 | End: 2025-04-04

## 2025-04-02 RX ORDER — SODIUM CHLORIDE, SODIUM LACTATE, POTASSIUM CHLORIDE, CALCIUM CHLORIDE 600; 310; 30; 20 MG/100ML; MG/100ML; MG/100ML; MG/100ML
INJECTION, SOLUTION INTRAVENOUS CONTINUOUS
Status: DISCONTINUED | OUTPATIENT
Start: 2025-04-02 | End: 2025-04-04

## 2025-04-02 RX ORDER — DIPHENHYDRAMINE HCL 25 MG
25 CAPSULE ORAL EVERY 4 HOURS PRN
Status: DISCONTINUED | OUTPATIENT
Start: 2025-04-02 | End: 2025-04-04

## 2025-04-02 RX ORDER — AMMONIA 15 % (W/V)
0.3 AMPUL (EA) INHALATION AS NEEDED
Status: DISCONTINUED | OUTPATIENT
Start: 2025-04-02 | End: 2025-04-04

## 2025-04-02 RX ORDER — DEXTROSE, SODIUM CHLORIDE, SODIUM LACTATE, POTASSIUM CHLORIDE, AND CALCIUM CHLORIDE 5; .6; .31; .03; .02 G/100ML; G/100ML; G/100ML; G/100ML; G/100ML
INJECTION, SOLUTION INTRAVENOUS CONTINUOUS PRN
Status: DISCONTINUED | OUTPATIENT
Start: 2025-04-02 | End: 2025-04-04

## 2025-04-02 RX ADMIN — SODIUM CHLORIDE, SODIUM LACTATE, POTASSIUM CHLORIDE, CALCIUM CHLORIDE: 600; 310; 30; 20 INJECTION, SOLUTION INTRAVENOUS at 01:44:00

## 2025-04-02 RX ADMIN — ONDANSETRON 4 MG: 2 INJECTION INTRAMUSCULAR; INTRAVENOUS at 01:52:00

## 2025-04-02 RX ADMIN — PHENYLEPHRINE HCL 100 MCG: 10 MG/ML VIAL (ML) INJECTION at 01:52:00

## 2025-04-02 RX ADMIN — DEXAMETHASONE SODIUM PHOSPHATE 4 MG: 4 MG/ML VIAL (ML) INJECTION at 01:52:00

## 2025-04-02 RX ADMIN — MORPHINE SULFATE 0.2 MG: 2 INJECTION, SOLUTION INTRAMUSCULAR; INTRAVENOUS at 01:50:00

## 2025-04-02 RX ADMIN — BUPIVACAINE HYDROCHLORIDE 1.6 ML: 7.5 INJECTION, SOLUTION INTRASPINAL at 01:50:00

## 2025-04-02 RX ADMIN — SODIUM CHLORIDE, SODIUM LACTATE, POTASSIUM CHLORIDE, CALCIUM CHLORIDE: 600; 310; 30; 20 INJECTION, SOLUTION INTRAVENOUS at 01:58:00

## 2025-04-02 RX ADMIN — SODIUM CHLORIDE, SODIUM LACTATE, POTASSIUM CHLORIDE, CALCIUM CHLORIDE: 600; 310; 30; 20 INJECTION, SOLUTION INTRAVENOUS at 02:28:00

## 2025-04-02 NOTE — OPERATIVE REPORT
[facility]   Section - Operative Note    Sera Echeverria Patient Status:  Inpatient    9/10/1985 MRN [id number]   Location [facility] LABOR & DELIVERY Attending Peggy Hudson MD   Hosp Day # 0 PCP No primary care provider on file.       Preoperative Diagnosis: prior  section x1, AMA, antiphospholipid syndrome, IVF pregnancy, rh negative, hypothyroidism, PROM       Postoperative Diagnosis: Same - Delivered    Procedure: Low Transverse  Section, Single Layer Uterine Closure    Primary surgeon: Peggy Hudson MD    Assistant: Maryjo Burgos MD  The involvement of the assisting physician was necessary in order to provide aid in exposure/retraction, hemostasis, closure, and other intraoperative technical functions in order to facilitate me as the primary surgeon carry out a safe operation with optimized results/outcome.    Indications:  PROM, prior  section x1    Surgical Findings:     Baby: male        APGAR 1':  8  APGAR 5':  9    Grossly normal appearing placenta with centrally-inserted 3 vessel cord  Clear amniotic fluid  Grossly normal appearing bilateral tubes and ovaries  Keloid scar removed    Anesthesia: spinal     QBL: pending    Procedure:   The patient was taken to the operating room where she was properly identified to the OR staff and attending physician. She received spinal anesthesia preoperatively.  Fetal heart tones were appreciated and found to be appropriate. A Krause catheter was aseptically inserted and SCDs were placed.  The abdomen was prepped with Chloraprep. Following appropriate drying time, the patient was draped in the usual sterile manner for a Pfannenstiel incision.  The patient had received Ancef 2g and azithromycin 500mg IV pre-operatively for prophylaxis.  A Time Out was held and the above information confirmed.  The patient was identified as Sera Echeverria and the procedure verified as  Delivery.    A Pfannenstiel  incision was made and carried down through the underlying subcutaneous tissue to the fascia using a scalpel. Rectus fascia was then incised in the midline and extended laterally using Portillo scissors. The superior edge of the fascia was grasped with Kocher clamps, tented upward, and the underlying muscle was bluntly dissected off. The inferior edge was grasped with Kocher clamps and cleared in similar fashion.  All anatomic layers were well-demarcated. The rectus muscles were  and the peritoneum was identified and subsequently entered and extended longitudinally with blunt dissection. The vesicouterine peritoneum was identified and a bladder flap was created using Metzenbaum scissors. The bladder blade was inserted.      A low transverse uterine incision was made with the scalpel and extended laterally with blunt dissection. The amnion was entered sharply.  The surgeon's hand was inserted through the hysterotomy and the fetal head was palpated, elevated, and delivered through the uterine incision with the assistance of fundal pressure.  Baby had  spontaneous cry with good color and tone.  The umbilical cord was clamped and cut.  The infant was handed off to the  providers.  Cord blood and a segment of umbilical cord were obtained for evaluation and promptly sent to the lab.  The placenta delivered spontaneously with uterine fundal massage and was noted to have a centrally inserted 3 vessel cord.     The uterus was exteriorized and a moist lap sponge was used to clear the cavity of clots and products of conception. The uterine incision was closed with a running locked suture of 0 Vicryl. Good hemostasis was confirmed upon uterine closure. Suction was used to clear the posterior culdesac and the uterus was returned to the abdomen.  The paracolic gutters were inspected and cleared of all clots and debris with suction.  The fascia was closed with a running suture of 0 Vicryl.  Subcutaneous tissues were  closed with 2-0 plain gut suture. The skin was closed with 4-0 Monocryl in a subcuticular fashion.  Sterile dressing was applied and an abdominal binder was then placed.     At the conclusion of the procedure, all needle, sponge, and instrument counts were noted to be correct x2.  The patient tolerated the procedure well and was transferred to her the recovery room in stable condition.     Specimen:  none    Drains: miller    Condition:   stable    Complications: None; patient tolerated the procedure well.      Peggy Hudson MD  4/2/2025  2:49 AM

## 2025-04-02 NOTE — PROGRESS NOTES
Pt is a 39 year old female admitted to TRG2/TRG2-A.     Chief Complaint   Patient presents with    R/o Rom      Pt is  38w3d intra-uterine pregnancy.  Pt c/o SROM at 1930. States she began leaking clear fluid at that time and has continued to leak. Denies painful ctx, vaginal bleeding. Appears grossly ruptured upon arrival. Hx previous c/s, desires repeat.     History obtained, oriented to room, staff, and plan of care.     EFM tested and applied, FHTs tracing and audible in 130s. Abdomen soft and non-tender.

## 2025-04-02 NOTE — H&P
H&P Exam - Obstetrics   Sera Echeverria 39 year old female MRN: [id number]  Unit/Bed#: TRG2-A Encounter: [id number]    Chief Complaint: leaking of fluid    HPI:  Sera Echeverria is a 39 year old  female with an MAHOGANY of 2025, by Other Basis at 38w3d weeks gestation who is being admitted for who presents for leaking of fluid. Started earlier this evening and has continued to leak since then.    Contractions: none  Loss of fluid: yes  Vaginal bleeding: no  Fetal movement: yes    PREGNANCY COMPLICATIONS:   1) AMA  2) IVF pregnancy  3) Antiphospholipid syndrome: has been on anticoagulation during pregnancy, last heparin dose this AM  4) Rh negative  5) Prior  section x1  6) Hypothyroidism    OB History    Para Term  AB Living   4 1 1   2 1   SAB IAB Ectopic Multiple Live Births   2       1      # Outcome Date GA Lbr Lucho/2nd Weight Sex Type Anes PTL Lv   4 Current            3 Term 10/09/21 39w0d  7 lb 10 oz (3.459 kg) M CS-LTranv  N GABRIEL      Complications: Cephalopelvic Disproportion, Failure to progress   2 SAB 10/2020 8w0d    SAB      1 SAB 2020 8w0d    SAB          Baby complications/comments: none    Review of Systems   Constitutional: Negative.    Respiratory: Negative.     Cardiovascular: Negative.    Gastrointestinal: Negative.    Skin: Negative.    Neurological: Negative.        Past Medical History:    Antiphospholipid antibody syndrome (HCC)    Infertility, female    Subclinical hypothyroidism     Past Surgical History:   Procedure Laterality Date     N/A 10/09/2021    LTCS, arrest of dilation    Cyst removal Left     cyst removal from breast    Surg rx incomplete abortn      D&C x3     History   Alcohol Use Not Currently     History   Drug Use Unknown     History   Smoking Status    Never   Smokeless Tobacco    Never       Allergies  Allergies[1]    OBJECTIVE:    Vitals: Blood pressure 124/80, pulse 94, temperature 98.1 °F (36.7 °C), temperature  source Oral, resp. rate 16, height 5' 3\" (1.6 m), weight 145 lb (65.8 kg).Body mass index is 25.69 kg/m².    Physical Exam  Constitutional:       Appearance: She is well-developed.   HENT:      Head: Normocephalic and atraumatic.   Eyes:      Extraocular Movements: Extraocular movements intact.      Pupils: Pupils are equal, round, and reactive to light.   Cardiovascular:      Rate and Rhythm: Normal rate and regular rhythm.   Pulmonary:      Effort: Pulmonary effort is normal.   Abdominal:      Comments: Gravid   Skin:     General: Skin is warm and dry.   Neurological:      Mental Status: She is alert and oriented to person, place, and time.           Ferning: positive  Nitrazine: positive    Cervix: deferred       Fetal heart rate: 135bpm, moderate variability, accelerations, no decelerations       [place]: 3-6 minutes apart, patient not feeling       Placenta: anterior    EFW: 3287 g ( 7 lb 4 oz); 64%, cephalic at 37w    GBS: positive    Prenatal Labs:    Blood type: O negative  Antibody: negative  HIV: negative  Hepatitis B: negative  RPR: non-reactive  Rubella: Immune      ASSESSMENT:  40yo  at 38w3d weeks gestation who is being admitted for PROM. Patient with prior  section x 1 and declines TOLAC. Reviewed risks of bleeding, infection, and injury to surrounding structures. All questions answered. Decline sterilization.    PLAN:   - Admit   - CBC, RPR, Blood Type   - GBS positive status:  PCN for prophylaxis x1 dose, will plan for ancef for pre-op ppx   - Will plan for RCS after NPO status achieved or sooner if indicated       Peggy Hudson MD  2025  9:03 PM         [1]   Allergies  Allergen Reactions    Guaifenesin OTHER (SEE COMMENTS)     Heart racing, dizziness    Vancomycin HIVES and RASH    Erythromycin NAUSEA AND VOMITING and NAUSEA ONLY

## 2025-04-02 NOTE — PLAN OF CARE
Problem: GASTROINTESTINAL - ADULT  Goal: Minimal or absence of nausea and vomiting  Description: INTERVENTIONS:- Maintain adequate hydration with IV or PO as ordered and tolerated- Nasogastric tube to low intermittent suction as ordered- Evaluate effectiveness of ordered antiemetic medications- Provide nonpharmacologic comfort measures as appropriate- Advance diet as tolerated, if ordered- Obtain nutritional consult as needed- Evaluate fluid balance  Outcome: Progressing  Goal: Maintains or returns to baseline bowel function  Description: INTERVENTIONS:- Assess bowel function- Maintain adequate hydration with IV or PO as ordered and tolerated- Evaluate effectiveness of GI medications- Encourage mobilization and activity- Obtain nutritional consult as needed- Establish a toileting routine/schedule- Consider collaborating with pharmacy to review patient's medication profile  Outcome: Progressing     Problem: GENITOURINARY - ADULT  Goal: Absence of urinary retention  Description: INTERVENTIONS:- Assess patient’s ability to void and empty bladder- Monitor intake/output and perform bladder scan as needed- Follow urinary retention protocol/standard of care- Consider collaborating with pharmacy to review patient's medication profile- Implement strategies to promote bladder emptying  Outcome: Progressing     Problem: POSTPARTUM  Goal: Long Term Goal:Experiences normal postpartum course  Description: INTERVENTIONS:- Assess and monitor vital signs and lab values.- Assess fundus and lochia.- Provide ice/sitz baths for perineum discomfort.- Monitor healing of incision/episiotomy/laceration, and assess for signs and symptoms of infection and hematoma.- Assess bladder function and monitor for bladder distention.- Provide/instruct/assist with pericare as needed.- Provide VTE prophylaxis as needed.- Monitor bowel function.- Encourage ambulation and provide assistance as needed.- Assess and monitor emotional status and provide  social service/psych resources as needed.- Utilize standard precautions and use personal protective equipment as indicated. Ensure aseptic care of all intravenous lines and invasive tubes/drains.- Obtain immunization and exposure to communicable diseases history.  Outcome: Progressing  Goal: Optimize infant feeding at the breast  Description: INTERVENTIONS:- Initiate breast feeding within first hour after birth. - Monitor effectiveness of current breast feeding efforts.- Assess support systems available to mother/family.- Identify cultural beliefs/practices regarding lactation, letdown techniques, maternal food preferences.- Assess mother's knowledge and previous experience with breast feeding.- Provide information as needed about early infant feeding cues (e.g., rooting, lip smacking, sucking fingers/hand) versus late cue of crying.- Discuss/demonstrate breast feeding aids (e.g., infant sling, nursing footstool/pillows, and breast pumps).- Encourage mother/other family members to express feelings/concerns, and actively listen.- Educate father/SO about benefits of breast feeding and how to manage common lactation challenges.- Recommend avoidance of specific medications or substances incompatible with breast feeding.- Assess and monitor for signs of nipple pain/trauma.- Instruct and provide assistance with proper latch.- Review techniques for milk expression (breast pumping) and storage of breast milk. Provide pumping equipment/supplies, instructions and assistance, as needed.- Encourage rooming-in and breast feeding on demand.- Encourage skin-to-skin contact.- Provide LC support as needed.- Assess for and manage engorgement.- Provide breast feeding education handouts and information on community breast feeding support.   Outcome: Progressing  Goal: Establishment of adequate milk supply with medication/procedure interruptions  Description: INTERVENTIONS:- Review techniques for milk expression (breast pumping). -  Provide pumping equipment/supplies, instructions, and assistance until it is safe to breastfeed infant.  Outcome: Progressing  Goal: Appropriate maternal -  bonding  Description: INTERVENTIONS:- Assess caregiver- interactions.- Assess caregiver's emotional status and coping mechanisms.- Encourage caregiver to participate in  daily care.- Assess support systems available to mother/family.- Provide /case management support as needed.  Outcome: Progressing

## 2025-04-02 NOTE — PLAN OF CARE
Problem: BIRTH - VAGINAL/ SECTION  Goal: Fetal and maternal status remain reassuring during the birth process  Description: INTERVENTIONS:- Monitor vital signs- Monitor fetal heart rate- Monitor uterine activity- Monitor labor progression (vaginal delivery)- DVT prophylaxis (C/S delivery)- Surgical antibiotic prophylaxis (C/S delivery)  Outcome: Completed     Problem: PAIN - ADULT  Goal: Verbalizes/displays adequate comfort level or patient's stated pain goal  Description: INTERVENTIONS:- Encourage pt to monitor pain and request assistance- Assess pain using appropriate pain scale- Administer analgesics based on type and severity of pain and evaluate response- Implement non-pharmacological measures as appropriate and evaluate response- Consider cultural and social influences on pain and pain management- Manage/alleviate anxiety- Utilize distraction and/or relaxation techniques- Monitor for opioid side effects- Notify MD/LIP if interventions unsuccessful or patient reports new pain- Anticipate increased pain with activity and pre-medicate as appropriate  Outcome: Completed     Problem: ANXIETY  Goal: Will report anxiety at manageable levels  Description: INTERVENTIONS:- Administer medication as ordered- Teach and rehearse alternative coping skills- Provide emotional support with 1:1 interaction with staff  Outcome: Completed     Problem: Patient/Family Goals  Goal: Patient/Family Long Term Goal  Description: Patient's Long Term Goal: Safe,  delivery  Interventions:  See additional Care Plan goals for specific interventions  Outcome: Completed  Goal: Patient/Family Short Term Goal  Description: Patient's Short Term Goal: Effective pain management   Interventions: Spinal anesthesia  See additional Care Plan goals for specific interventions  Outcome: Completed

## 2025-04-02 NOTE — ANESTHESIA PREPROCEDURE EVALUATION
PRE-OP EVALUATION    Patient Name: Sera Echeverria    Admit Diagnosis: Pregnancy (HCC) [Z34.90]       SECTION    Anesthesia Procedure:  SECTION    Surgeons and Role:     * Elfego Moffett MD - Primary    Pre-op vitals reviewed.  Temp: 98.1 °F (36.7 °C)  Pulse: 94  Resp: 16  BP: 124/80     Body mass index is 25.69 kg/m².    Current medications reviewed.  Hospital Medications:   [COMPLETED] ampicillin (Omnipen) 2 g in sodium chloride 0.9% 100 mL IVPB-MBP  2 g Intravenous Once    [START ON 2025] acetaminophen (Tylenol Extra Strength) tab 1,000 mg  1,000 mg Oral Once    ondansetron (Zofran) 4 MG/2ML injection 4 mg  4 mg Intravenous Q6H PRN    [START ON 2025] sodium citrate-citric acid (Bicitra) 500-334 MG/5ML oral solution 30 mL  30 mL Oral Once    [START ON 2025] oxyTOCIN in sodium chloride 0.9% (Pitocin) 30 Units/500mL infusion premix  62.5-900 efe-units/min Intravenous Continuous    lactated ringers IV bolus 1,000 mL  1,000 mL Intravenous Once    Followed by    lactated ringers infusion  125 mL/hr Intravenous Continuous    [START ON 2025] ceFAZolin (Ancef) 2g in 10mL IV syringe premix  2 g Intravenous Once    [START ON 2025] azithromycin (Zithromax) 500 mg in sodium chloride 0.9% 250mL IVPB premix  500 mg Intravenous Once       Outpatient Medications:   Prescriptions Prior to Admission[1]    Allergies: Guaifenesin, Vancomycin, and Erythromycin      Anesthesia Evaluation    Patient summary reviewed.    Anesthetic Complications  (-) history of anesthetic complications         GI/Hepatic/Renal    Negative GI/hepatic/renal ROS.                             Cardiovascular    Negative cardiovascular ROS.    Exercise tolerance: good     MET: >4                                           Endo/Other  Comment: Antiphospholipid antibody syndrome         (+) hypothyroidism                       Pulmonary    Negative pulmonary ROS.                       Neuro/Psych    Negative  neuro/psych ROS.                          Ate at 1730, to OR in 8 hours        Past Surgical History:   Procedure Laterality Date     N/A 10/09/2021    LTCS, arrest of dilation    Cyst removal Left     cyst removal from breast    Surg rx incomplete abortn      D&C x3     Social History     Socioeconomic History    Marital status:    Tobacco Use    Smoking status: Never     Passive exposure: Never    Smokeless tobacco: Never   Vaping Use    Vaping status: Never Used   Substance and Sexual Activity    Alcohol use: Not Currently    Drug use: Never     History   Drug Use Unknown     Available pre-op labs reviewed.  Lab Results   Component Value Date    WBC 8.2 2025    RBC 3.42 (L) 2025    HGB 11.6 (L) 2025    HCT 33.1 (L) 2025    MCV 96.8 2025    MCH 33.9 2025    MCHC 35.0 2025    RDW 12.1 2025    .0 2025               Airway      Mallampati: I  Mouth opening: >3 FB  TM distance: > 6 cm   Cardiovascular    Cardiovascular exam normal.         Dental    Dentition appears grossly intact         Pulmonary    Pulmonary exam normal.                 Other findings              ASA: 2   Plan: spinal  NPO status verified and patient meets guidelines.    Post-procedure pain management plan discussed with surgeon and patient.      Plan/risks discussed with: patient and spouse                Present on Admission:  **None**             [1]   Medications Prior to Admission   Medication Sig Dispense Refill Last Dose/Taking    heparin 44767 Units/mL Injection Solution Inject 0.5 mL (5,000 Units total) into the skin in the morning and 0.5 mL (5,000 Units total) before bedtime.   2025 at  7:00 AM    Doxylamine Succinate, Sleep, (UNISOM OR) Take by mouth at bedtime.   3/31/2025 at  9:00 PM    Prenatal Vit w/Gb-Gzowmgvly-SJ (PNV OR) Take by mouth daily.   2025 at  7:00 AM    levothyroxine 25 MCG Oral Tab Take 1 tablet (25 mcg total) by mouth before  breakfast.   4/1/2025 at  7:00 AM    aspirin 81 MG Oral Tab EC Take 1 tablet (81 mg total) by mouth daily.   4/1/2025 at  7:00 AM    diphenhydrAMINE HCl 50 MG Oral Tab Take 1 tablet (50 mg total) by mouth nightly as needed for Itching.       amoxicillin 500 MG Oral Cap Take 875 mg by mouth 2 (two) times daily.       enoxaparin (LOVENOX) 40 MG/0.4ML Injection Solution Prefilled Syringe Inject 0.4 mL (40 mg total) into the skin daily.

## 2025-04-02 NOTE — PROGRESS NOTES
Patient transferred to mother/baby room 2216 per cart in stable condition with baby and personal belongings. Accompanied by  and staff. Report given to mother/baby RN.

## 2025-04-02 NOTE — PROGRESS NOTES
Patient admitted via wheelchair to room.  ID bands cross matched with L&D rn. Oriented to room. Safety precautions initiated. Bed in low position Call light in reach. D/C teachings initiated bedside. Plan of care discussed

## 2025-04-02 NOTE — ANESTHESIA POSTPROCEDURE EVALUATION
[facility]    Sera Echeverria Patient Status:  Inpatient   Age/Gender 39 year old female MRN [id number]   Location [facility] LABOR & DELIVERY Attending Peggy Hudson MD   Hosp Day # 0 PCP No primary care provider on file.       Anesthesia Post-op Note     SECTION    Procedure Summary       Date: 25 Room / Location:  L+D OR   L+D OR    Anesthesia Start: 144 Anesthesia Stop: 255    Procedure:  SECTION Diagnosis:     Surgeons: Peggy Hudson MD Anesthesiologist: Nayeli Keen MD    Anesthesia Type: spinal ASA Status: 2            Anesthesia Type: spinal    Vitals Value Taken Time   BP 97/55 25 0255   Temp 97.4 °F (36.3 °C) 25 0255   Pulse 72 25 0257   Resp 16 25 0257   SpO2 100 % 25   Vitals shown include unfiled device data.        Patient Location: PACU    Anesthesia Type: spinal    Airway Patency: patent    Postop Pain Control: adequate    Mental Status: preanesthetic baseline    Nausea/Vomiting: none    Cardiopulmonary/Hydration status: stable euvolemic    Complications: no apparent anesthesia related complications    Postop vital signs: stable    Dental Exam: Unchanged from Preop    Patient to be discharged from PACU when criteria met.

## 2025-04-02 NOTE — PLAN OF CARE
Problem: BIRTH - VAGINAL/ SECTION  Goal: Fetal and maternal status remain reassuring during the birth process  Description: INTERVENTIONS:- Monitor vital signs- Monitor fetal heart rate- Monitor uterine activity- Monitor labor progression (vaginal delivery)- DVT prophylaxis (C/S delivery)- Surgical antibiotic prophylaxis (C/S delivery)  Outcome: Progressing     Problem: PAIN - ADULT  Goal: Verbalizes/displays adequate comfort level or patient's stated pain goal  Description: INTERVENTIONS:- Encourage pt to monitor pain and request assistance- Assess pain using appropriate pain scale- Administer analgesics based on type and severity of pain and evaluate response- Implement non-pharmacological measures as appropriate and evaluate response- Consider cultural and social influences on pain and pain management- Manage/alleviate anxiety- Utilize distraction and/or relaxation techniques- Monitor for opioid side effects- Notify MD/LIP if interventions unsuccessful or patient reports new pain- Anticipate increased pain with activity and pre-medicate as appropriate  Outcome: Progressing     Problem: ANXIETY  Goal: Will report anxiety at manageable levels  Description: INTERVENTIONS:- Administer medication as ordered- Teach and rehearse alternative coping skills- Provide emotional support with 1:1 interaction with staff  Outcome: Progressing     Problem: Patient/Family Goals  Goal: Patient/Family Long Term Goal  Description: Patient's Long Term Goal: Safe,  delivery  Interventions:  See additional Care Plan goals for specific interventions  Outcome: Progressing  Goal: Patient/Family Short Term Goal  Description: Patient's Short Term Goal: Effective pain management   Interventions: Spinal anesthesia  See additional Care Plan goals for specific interventions  Outcome: Progressing

## 2025-04-02 NOTE — ANESTHESIA PROCEDURE NOTES
Spinal Block    Performed by: Nayeli Keen MD  Authorized by: Nayeli Keen MD      General Information and Staff    Start Time:   Anesthesiologist:  Nayeli Keen MD  Performed by:  Anesthesiologist  Site identification: surface landmarks    Preanesthetic Checklist: patient identified, IV checked, risks and benefits discussed, monitors and equipment checked, pre-op evaluation, timeout performed, anesthesia consent and sterile technique used      Procedure Details    Patient Position:  Sitting  Prep: ChloraPrep    Monitoring:  Cardiac monitor, heart rate and continuous pulse ox  Approach:  Midline  Location:  L3-4  Injection Technique:  Single-shot    Needle    Needle Type:  Sprotte  Needle Gauge:  24 G  Needle Length:  3.5 in    Assessment    Sensory Level:  T4  Events: clear CSF, CSF aspirated, well tolerated and blood negative      Additional Comments     One attempt, no complications

## 2025-04-03 LAB
BASOPHILS # BLD AUTO: 0.05 X10(3) UL (ref 0–0.2)
BASOPHILS NFR BLD AUTO: 0.5 %
EOSINOPHIL # BLD AUTO: 0.11 X10(3) UL (ref 0–0.7)
EOSINOPHIL NFR BLD AUTO: 1 %
ERYTHROCYTE [DISTWIDTH] IN BLOOD BY AUTOMATED COUNT: 12.5 %
HCT VFR BLD AUTO: 30.7 %
HGB BLD-MCNC: 10.5 G/DL
IMM GRANULOCYTES # BLD AUTO: 0.1 X10(3) UL (ref 0–1)
IMM GRANULOCYTES NFR BLD: 0.9 %
LYMPHOCYTES # BLD AUTO: 1.51 X10(3) UL (ref 1–4)
LYMPHOCYTES NFR BLD AUTO: 14.2 %
MCH RBC QN AUTO: 34.3 PG (ref 26–34)
MCHC RBC AUTO-ENTMCNC: 34.2 G/DL (ref 31–37)
MCV RBC AUTO: 100.3 FL
MONOCYTES # BLD AUTO: 0.72 X10(3) UL (ref 0.1–1)
MONOCYTES NFR BLD AUTO: 6.8 %
NEUTROPHILS # BLD AUTO: 8.17 X10 (3) UL (ref 1.5–7.7)
NEUTROPHILS # BLD AUTO: 8.17 X10(3) UL (ref 1.5–7.7)
NEUTROPHILS NFR BLD AUTO: 76.6 %
PLATELET # BLD AUTO: 169 10(3)UL (ref 150–450)
RBC # BLD AUTO: 3.06 X10(6)UL
WBC # BLD AUTO: 10.7 X10(3) UL (ref 4–11)

## 2025-04-03 NOTE — PROGRESS NOTES
[facility]SW-J [facility]    Sera Echeverria Patient Status:  Inpatient   Age/Gender 39 year old female MRN [id number]   Location [facility] 2SW-J Attending Peggy Hudson MD   Hosp Day # 1 PCP No primary care provider on file.      Anesthesia Pain Progress Note    Anesthesia Technique:   Spinal Anesthesia     Pain Management Technique:  In addition to available oral supplemental and IV medications  Patient received neuraxial preservative free morphine for post procedural pain control.    Post Procedure Pain Quality:    Adequate    Pain Management Side Effects:  None     /74 (BP Location: Left arm)   Pulse 60   Temp 97.9 °F (36.6 °C) (Oral)   Resp 16   Ht 1.6 m (5' 3\")   Wt 65.8 kg (145 lb)   SpO2 98%   Breastfeeding No   BMI 25.69 kg/m²       Injection Site: Injection site is intact on inspection     Complications from Pain Management or Anesthesia:   None    All patient questions were answered.  Follow up pain management is separate from intraoperative anesthetic needs.  Pain care is transitioned to primary service, with management by oral medications.    Thank you for asking us to participate in the care of your patient.    Ariela Argueta DO, 25, 12:58 PM      Ariela Argueta DO, 25, 12:58 PM

## 2025-04-03 NOTE — PLAN OF CARE
Problem: GASTROINTESTINAL - ADULT  Goal: Minimal or absence of nausea and vomiting  Description: INTERVENTIONS:- Maintain adequate hydration with IV or PO as ordered and tolerated- Nasogastric tube to low intermittent suction as ordered- Evaluate effectiveness of ordered antiemetic medications- Provide nonpharmacologic comfort measures as appropriate- Advance diet as tolerated, if ordered- Obtain nutritional consult as needed- Evaluate fluid balance  Outcome: Completed  Goal: Maintains or returns to baseline bowel function  Description: INTERVENTIONS:- Assess bowel function- Maintain adequate hydration with IV or PO as ordered and tolerated- Evaluate effectiveness of GI medications- Encourage mobilization and activity- Obtain nutritional consult as needed- Establish a toileting routine/schedule- Consider collaborating with pharmacy to review patient's medication profile  Outcome: Progressing     Problem: GENITOURINARY - ADULT  Goal: Absence of urinary retention  Description: INTERVENTIONS:- Assess patient’s ability to void and empty bladder- Monitor intake/output and perform bladder scan as needed- Follow urinary retention protocol/standard of care- Consider collaborating with pharmacy to review patient's medication profile- Implement strategies to promote bladder emptying  Outcome: Progressing     Problem: POSTPARTUM  Goal: Long Term Goal:Experiences normal postpartum course  Description: INTERVENTIONS:- Assess and monitor vital signs and lab values.- Assess fundus and lochia.- Provide ice/sitz baths for perineum discomfort.- Monitor healing of incision/episiotomy/laceration, and assess for signs and symptoms of infection and hematoma.- Assess bladder function and monitor for bladder distention.- Provide/instruct/assist with pericare as needed.- Provide VTE prophylaxis as needed.- Monitor bowel function.- Encourage ambulation and provide assistance as needed.- Assess and monitor emotional status and provide  social service/psych resources as needed.- Utilize standard precautions and use personal protective equipment as indicated. Ensure aseptic care of all intravenous lines and invasive tubes/drains.- Obtain immunization and exposure to communicable diseases history.  Outcome: Progressing  Goal: Optimize infant feeding at the breast  Description: INTERVENTIONS:- Initiate breast feeding within first hour after birth. - Monitor effectiveness of current breast feeding efforts.- Assess support systems available to mother/family.- Identify cultural beliefs/practices regarding lactation, letdown techniques, maternal food preferences.- Assess mother's knowledge and previous experience with breast feeding.- Provide information as needed about early infant feeding cues (e.g., rooting, lip smacking, sucking fingers/hand) versus late cue of crying.- Discuss/demonstrate breast feeding aids (e.g., infant sling, nursing footstool/pillows, and breast pumps).- Encourage mother/other family members to express feelings/concerns, and actively listen.- Educate father/SO about benefits of breast feeding and how to manage common lactation challenges.- Recommend avoidance of specific medications or substances incompatible with breast feeding.- Assess and monitor for signs of nipple pain/trauma.- Instruct and provide assistance with proper latch.- Review techniques for milk expression (breast pumping) and storage of breast milk. Provide pumping equipment/supplies, instructions and assistance, as needed.- Encourage rooming-in and breast feeding on demand.- Encourage skin-to-skin contact.- Provide LC support as needed.- Assess for and manage engorgement.- Provide breast feeding education handouts and information on community breast feeding support.   Outcome: Progressing  Goal: Establishment of adequate milk supply with medication/procedure interruptions  Description: INTERVENTIONS:- Review techniques for milk expression (breast pumping). -  Provide pumping equipment/supplies, instructions, and assistance until it is safe to breastfeed infant.  Outcome: Progressing  Goal: Appropriate maternal -  bonding  Description: INTERVENTIONS:- Assess caregiver- interactions.- Assess caregiver's emotional status and coping mechanisms.- Encourage caregiver to participate in  daily care.- Assess support systems available to mother/family.- Provide /case management support as needed.  Outcome: Progressing

## 2025-04-03 NOTE — PROGRESS NOTES
Postpartum Progress Note    SUBJECTIVE:    Post-op day #1 s/p repeat  section    No overnight events.  No complaints.  Has been out of bed, tolerating PO, voiding, passing flatus.      OBJECTIVE:    Vital signs in last 24 hours:  Temp:  [97.6 °F (36.4 °C)-98.1 °F (36.7 °C)] 97.9 °F (36.6 °C)  Pulse:  [55-69] 60  Resp:  [16] 16  BP: (101-111)/(58-74) 111/74  SpO2:  [98 %] 98 %  Input/Output:    Intake/Output Summary (Last 24 hours) at 4/3/2025 0901  Last data filed at 2025  Gross per 24 hour   Intake --   Output 1300 ml   Net -1300 ml       Gen: appears well, nad  Abd: soft, appropriate post-op tenderness, fundus firm below umbilicus  Inc: c/d/i with sutures/steris  Nina: minimal lochia  Ext: nontender, minimal edema    Recent Labs   Lab 25  0719   RBC 3.42* 3.06*   HGB 11.6* 10.5*   HCT 33.1* 30.7*   MCV 96.8 100.3*   MCH 33.9 34.3*   MCHC 35.0 34.2   RDW 12.1 12.5   NEPRELIM 5.60 8.17*   WBC 8.2 10.7   .0 169.0         ASSESSMENT/PLAN:    POD #1 s/p repeat  section  Recovering well  Continue current care  APS: continue lovenox x 6 weeks   circumcision discussed with parent.   Explained use of anesthesia, risks including blood loss, infection, injury, unsatisfactory cosmetic outcome.   All questions answered and verbal consent obtained.       Peggy Hudson MD  4/3/2025  9:01 AM

## 2025-04-03 NOTE — PLAN OF CARE
Problem: GASTROINTESTINAL - ADULT  Goal: Maintains or returns to baseline bowel function  Description: INTERVENTIONS:- Assess bowel function- Maintain adequate hydration with IV or PO as ordered and tolerated- Evaluate effectiveness of GI medications- Encourage mobilization and activity- Obtain nutritional consult as needed- Establish a toileting routine/schedule- Consider collaborating with pharmacy to review patient's medication profile  Outcome: Progressing     Problem: GENITOURINARY - ADULT  Goal: Absence of urinary retention  Description: INTERVENTIONS:- Assess patient’s ability to void and empty bladder- Monitor intake/output and perform bladder scan as needed- Follow urinary retention protocol/standard of care- Consider collaborating with pharmacy to review patient's medication profile- Implement strategies to promote bladder emptying  Outcome: Progressing     Problem: POSTPARTUM  Goal: Long Term Goal:Experiences normal postpartum course  Description: INTERVENTIONS:- Assess and monitor vital signs and lab values.- Assess fundus and lochia.- Provide ice/sitz baths for perineum discomfort.- Monitor healing of incision/episiotomy/laceration, and assess for signs and symptoms of infection and hematoma.- Assess bladder function and monitor for bladder distention.- Provide/instruct/assist with pericare as needed.- Provide VTE prophylaxis as needed.- Monitor bowel function.- Encourage ambulation and provide assistance as needed.- Assess and monitor emotional status and provide social service/psych resources as needed.- Utilize standard precautions and use personal protective equipment as indicated. Ensure aseptic care of all intravenous lines and invasive tubes/drains.- Obtain immunization and exposure to communicable diseases history.  Outcome: Progressing  Goal: Appropriate maternal -  bonding  Description: INTERVENTIONS:- Assess caregiver- interactions.- Assess caregiver's emotional status and  coping mechanisms.- Encourage caregiver to participate in  daily care.- Assess support systems available to mother/family.- Provide /case management support as needed.  Outcome: Progressing

## 2025-04-04 VITALS
TEMPERATURE: 98 F | DIASTOLIC BLOOD PRESSURE: 78 MMHG | OXYGEN SATURATION: 98 % | SYSTOLIC BLOOD PRESSURE: 125 MMHG | RESPIRATION RATE: 16 BRPM | HEART RATE: 70 BPM | HEIGHT: 63 IN | BODY MASS INDEX: 25.69 KG/M2 | WEIGHT: 145 LBS

## 2025-04-04 RX ORDER — ACETAMINOPHEN 500 MG
1000 TABLET ORAL EVERY 6 HOURS
Status: SHIPPED | COMMUNITY
Start: 2025-04-04

## 2025-04-04 RX ORDER — GABAPENTIN 300 MG/1
300 CAPSULE ORAL EVERY 8 HOURS PRN
Qty: 30 CAPSULE | Refills: 0 | Status: SHIPPED | OUTPATIENT
Start: 2025-04-04

## 2025-04-04 RX ORDER — IBUPROFEN 600 MG/1
600 TABLET, FILM COATED ORAL EVERY 6 HOURS
Status: SHIPPED | COMMUNITY
Start: 2025-04-04

## 2025-04-04 NOTE — DISCHARGE SUMMARY
Reason for Admission: pregnancy      Hospital Course:   followed by uncomplicated postop course. Pt was admitted with SROM.    Complications: none    Disposition: Home or Self Care    Discharge Condition: Good    Discharge Medications:  motrin, tylenol, gabapentin, lovenox    Follow up Visits: Follow-up with MD office in 2 weeks    Other Discharge Instructions:      Admission: 2025    Discharge date: 25    Diet: general

## 2025-04-04 NOTE — PROGRESS NOTES
Postpartum Progress Note    SUBJECTIVE:    Post-op day #2 s/p repeat  section    No overnight events.  No complaints.  Has been out of bed, tolerating PO, voiding, passing flatus.  BM today.    OBJECTIVE:    Vital signs in last 24 hours:  Temp:  [97.8 °F (36.6 °C)] 97.8 °F (36.6 °C)  Pulse:  [68-70] 70  Resp:  [16] 16  BP: (115-125)/(71-78) 125/78  Input/Output:  No intake or output data in the 24 hours ending 25 1345      Gen: appears well, nad  Abd: soft, appropriate post-op tenderness, fundus firm below umbilicus  Inc: c/d/i with sutures/steris  Nina: minimal lochia  Ext: nontender, minimal edema    Recent Labs   Lab 25  2115 25  0719   RBC 3.42* 3.06*   HGB 11.6* 10.5*   HCT 33.1* 30.7*   MCV 96.8 100.3*   MCH 33.9 34.3*   MCHC 35.0 34.2   RDW 12.1 12.5   NEPRELIM 5.60 8.17*   WBC 8.2 10.7   .0 169.0         ASSESSMENT/PLAN:    POD #2 s/p repeat  section  Recovering well  Continue current care  APS: continue lovenox x 6 weeks  Discharge home

## 2025-04-04 NOTE — PROGRESS NOTES
Patient AOx4, up and about, VSS, seen by OB, ok to go home, postpartum care, meds and  care DC instructions reviewed and completed, and will make the follow up appointment as instructed, significant other at bedside , very supportive, and answered questions , LC also worked with patient and instructions completed, patient verbalized and demonstrated understanding of instructions, and signed infant custody release electronically .  Patient up in wheelchair, going home with significant other , and baby in car seat, with their belongings

## 2025-04-04 NOTE — PLAN OF CARE
Problem: POSTPARTUM  Goal: Long Term Goal:Experiences normal postpartum course  Description: INTERVENTIONS:- Assess and monitor vital signs and lab values.- Assess fundus and lochia.- Provide ice/sitz baths for perineum discomfort.- Monitor healing of incision/episiotomy/laceration, and assess for signs and symptoms of infection and hematoma.- Assess bladder function and monitor for bladder distention.- Provide/instruct/assist with pericare as needed.- Provide VTE prophylaxis as needed.- Monitor bowel function.- Encourage ambulation and provide assistance as needed.- Assess and monitor emotional status and provide social service/psych resources as needed.- Utilize standard precautions and use personal protective equipment as indicated. Ensure aseptic care of all intravenous lines and invasive tubes/drains.- Obtain immunization and exposure to communicable diseases history.  Outcome: Completed  Goal: Appropriate maternal -  bonding  Description: INTERVENTIONS:- Assess caregiver- interactions.- Assess caregiver's emotional status and coping mechanisms.- Encourage caregiver to participate in  daily care.- Assess support systems available to mother/family.- Provide /case management support as needed.  Outcome: Completed

## 2025-04-06 ENCOUNTER — TELEPHONE (OUTPATIENT)
Dept: OBGYN UNIT | Facility: HOSPITAL | Age: 40
End: 2025-04-06

## 2025-04-06 NOTE — PROGRESS NOTES
Reviewed self and infant care w / mom, she verbalizes understanding of instructions reviewed. Encourage to follow up w/ MDs as directed and w/ questions/concerns. Doing well, khadijah. Will make ped appt for tomorrow. Will my chart OB about antiphos med they had her on and want her to continue at home x 2 weeks, needs it reordered from pharmacy. All other questions answered.

## 2025-05-15 ENCOUNTER — E-ADVICE (OUTPATIENT)
Dept: FAMILY MEDICINE | Age: 40
End: 2025-05-15

## 2025-06-21 ENCOUNTER — LAB SERVICES (OUTPATIENT)
Dept: LAB | Age: 40
End: 2025-06-21

## 2025-06-21 DIAGNOSIS — Z83.49 FAMILY HISTORY OF THYROID DISEASE: ICD-10-CM

## 2025-06-21 DIAGNOSIS — E03.9 HYPOTHYROIDISM, UNSPECIFIED TYPE: ICD-10-CM

## 2025-06-21 LAB
T4 FREE SERPL-MCNC: 1.6 NG/DL (ref 0.8–1.5)
TSH SERPL-ACNC: 0.01 MCUNITS/ML (ref 0.35–5)

## 2025-06-21 PROCEDURE — 36415 COLL VENOUS BLD VENIPUNCTURE: CPT | Performed by: FAMILY MEDICINE

## 2025-06-21 PROCEDURE — 86376 MICROSOMAL ANTIBODY EACH: CPT | Performed by: CLINICAL MEDICAL LABORATORY

## 2025-06-21 PROCEDURE — 84443 ASSAY THYROID STIM HORMONE: CPT | Performed by: INTERNAL MEDICINE

## 2025-06-21 PROCEDURE — 84439 ASSAY OF FREE THYROXINE: CPT | Performed by: INTERNAL MEDICINE

## 2025-06-21 PROCEDURE — 86800 THYROGLOBULIN ANTIBODY: CPT | Performed by: CLINICAL MEDICAL LABORATORY

## 2025-06-22 LAB
THYROGLOB AB SERPL-ACNC: <0.9 IU/ML (ref 0–4)
THYROPEROXIDASE AB SERPL-ACNC: <28 UNITS/ML

## 2025-06-23 ENCOUNTER — RESULTS FOLLOW-UP (OUTPATIENT)
Dept: FAMILY MEDICINE | Age: 40
End: 2025-06-23

## 2025-06-26 ENCOUNTER — APPOINTMENT (OUTPATIENT)
Dept: FAMILY MEDICINE | Age: 40
End: 2025-06-26

## 2025-06-26 ENCOUNTER — TELEPHONE (OUTPATIENT)
Dept: FAMILY MEDICINE | Age: 40
End: 2025-06-26

## 2025-06-26 ENCOUNTER — CLINICAL ABSTRACT (OUTPATIENT)
Dept: HEALTH INFORMATION MANAGEMENT | Facility: OTHER | Age: 40
End: 2025-06-26

## 2025-06-26 ENCOUNTER — E-ADVICE (OUTPATIENT)
Dept: FAMILY MEDICINE | Age: 40
End: 2025-06-26

## 2025-06-26 DIAGNOSIS — Z30.09 BIRTH CONTROL COUNSELING: ICD-10-CM

## 2025-06-26 DIAGNOSIS — R76.0 ANTIPHOSPHOLIPID ANTIBODY POSITIVE: Primary | ICD-10-CM

## 2025-06-26 DIAGNOSIS — R79.89 ELEVATED BETA-2 MICROGLOBULIN: ICD-10-CM

## 2025-06-26 DIAGNOSIS — E03.9 HYPOTHYROIDISM, UNSPECIFIED TYPE: ICD-10-CM

## 2025-06-26 PROBLEM — D68.61 ANTIPHOSPHOLIPID ANTIBODY SYNDROME COMPLICATING PREGNANCY  (CMD): Status: RESOLVED | Noted: 2024-09-09 | Resolved: 2025-06-26

## 2025-06-26 PROBLEM — O99.820 GROUP B STREPTOCOCCUS CARRIER, +RV CULTURE, CURRENTLY PREGNANT (CMD): Status: RESOLVED | Noted: 2025-03-19 | Resolved: 2025-06-26

## 2025-06-26 PROBLEM — D68.61 ANTIPHOSPHOLIPID ANTIBODY SYNDROME COMPLICATING PREGNANCY  (CMD): Status: ACTIVE | Noted: 2024-09-09

## 2025-06-26 PROBLEM — Z34.93 THIRD TRIMESTER PREGNANCY (CMD): Status: RESOLVED | Noted: 2025-03-21 | Resolved: 2025-06-26

## 2025-06-26 PROBLEM — Z31.5 ENCOUNTER FOR PROCREATIVE GENETIC COUNSELING: Status: RESOLVED | Noted: 2020-04-22 | Resolved: 2025-06-26

## 2025-06-26 PROBLEM — O99.820 GROUP B STREPTOCOCCUS CARRIER, +RV CULTURE, CURRENTLY PREGNANT (CMD): Status: ACTIVE | Noted: 2025-03-19

## 2025-06-26 PROBLEM — O99.119 ANTIPHOSPHOLIPID ANTIBODY SYNDROME COMPLICATING PREGNANCY  (CMD): Status: ACTIVE | Noted: 2024-09-09

## 2025-06-26 PROBLEM — O99.119 ANTIPHOSPHOLIPID ANTIBODY SYNDROME COMPLICATING PREGNANCY  (CMD): Status: RESOLVED | Noted: 2024-09-09 | Resolved: 2025-06-26

## 2025-06-26 PROBLEM — O34.219 HISTORY OF CESAREAN DELIVERY, CURRENTLY PREGNANT (CMD): Status: ACTIVE | Noted: 2024-09-09

## 2025-06-26 RX ORDER — ACETAMINOPHEN AND CODEINE PHOSPHATE 120; 12 MG/5ML; MG/5ML
0.35 SOLUTION ORAL
COMMUNITY
Start: 2025-05-14 | End: 2025-06-26 | Stop reason: SINTOL

## 2025-06-26 ASSESSMENT — PATIENT HEALTH QUESTIONNAIRE - PHQ9
CLINICAL INTERPRETATION OF PHQ2 SCORE: NO FURTHER SCREENING NEEDED
2. FEELING DOWN, DEPRESSED OR HOPELESS: NOT AT ALL
1. LITTLE INTEREST OR PLEASURE IN DOING THINGS: NOT AT ALL
SUM OF ALL RESPONSES TO PHQ9 QUESTIONS 1 AND 2: 0
SUM OF ALL RESPONSES TO PHQ9 QUESTIONS 1 AND 2: 0

## 2025-07-24 ENCOUNTER — LAB SERVICES (OUTPATIENT)
Dept: LAB | Age: 40
End: 2025-07-24

## 2025-07-24 DIAGNOSIS — R76.0 ANTIPHOSPHOLIPID ANTIBODY POSITIVE: ICD-10-CM

## 2025-07-24 DIAGNOSIS — R79.89 ELEVATED BETA-2 MICROGLOBULIN: ICD-10-CM

## 2025-07-24 PROCEDURE — 36415 COLL VENOUS BLD VENIPUNCTURE: CPT | Performed by: FAMILY MEDICINE

## 2025-07-24 PROCEDURE — 86146 BETA-2 GLYCOPROTEIN ANTIBODY: CPT | Performed by: CLINICAL MEDICAL LABORATORY

## 2025-07-25 LAB
B2 GLYCOPROT1 IGA SERPL IA-ACNC: 1.9 U/ML
B2 GLYCOPROT1 IGG SERPL IA-ACNC: 54 U/ML
B2 GLYCOPROT1 IGM SERPL IA-ACNC: <2.5 U/ML

## 2025-07-28 ENCOUNTER — E-ADVICE (OUTPATIENT)
Dept: INFUSION THERAPY | Age: 40
End: 2025-07-28

## 2025-07-28 ENCOUNTER — TELEPHONE (OUTPATIENT)
Dept: INFUSION THERAPY | Age: 40
End: 2025-07-28

## 2025-08-26 ENCOUNTER — LAB SERVICES (OUTPATIENT)
Dept: LAB | Age: 40
End: 2025-08-26

## 2025-08-26 ENCOUNTER — OFFICE VISIT (OUTPATIENT)
Dept: INFUSION THERAPY | Age: 40
End: 2025-08-26
Attending: FAMILY MEDICINE

## 2025-08-26 VITALS
TEMPERATURE: 97.4 F | BODY MASS INDEX: 20.73 KG/M2 | DIASTOLIC BLOOD PRESSURE: 62 MMHG | RESPIRATION RATE: 18 BRPM | OXYGEN SATURATION: 96 % | SYSTOLIC BLOOD PRESSURE: 112 MMHG | WEIGHT: 117 LBS | HEART RATE: 69 BPM

## 2025-08-26 DIAGNOSIS — D68.61 APL (ANTIPHOSPHOLIPID SYNDROME)  (CMD): ICD-10-CM

## 2025-08-26 DIAGNOSIS — D64.9 ANEMIA, UNSPECIFIED TYPE: ICD-10-CM

## 2025-08-26 DIAGNOSIS — D68.61 APL (ANTIPHOSPHOLIPID SYNDROME)  (CMD): Primary | ICD-10-CM

## 2025-08-26 LAB
BASOPHILS # BLD: 0.1 K/MCL (ref 0–0.3)
BASOPHILS NFR BLD: 1 %
DEPRECATED RDW RBC: 38.6 FL (ref 39–50)
EOSINOPHIL # BLD: 0.2 K/MCL (ref 0–0.5)
EOSINOPHIL NFR BLD: 3 %
ERYTHROCYTE [DISTWIDTH] IN BLOOD: 11.2 % (ref 11–15)
FERRITIN SERPL-MCNC: 31 NG/ML (ref 8–252)
FOLATE SERPL-MCNC: >24 NG/ML
HCT VFR BLD CALC: 41.4 % (ref 36–46.5)
HGB BLD-MCNC: 14.1 G/DL (ref 12–15.5)
IMM GRANULOCYTES # BLD AUTO: 0 K/MCL (ref 0–0.2)
IMM GRANULOCYTES # BLD: 0 %
IRON SATN MFR SERPL: 25 % (ref 15–45)
IRON SERPL-MCNC: 70 MCG/DL (ref 50–170)
LYMPHOCYTES # BLD: 2 K/MCL (ref 1–4.8)
LYMPHOCYTES NFR BLD: 31 %
MCH RBC QN AUTO: 31.7 PG (ref 26–34)
MCHC RBC AUTO-ENTMCNC: 34.1 G/DL (ref 32–36.5)
MCV RBC AUTO: 93 FL (ref 78–100)
MONOCYTES # BLD: 0.5 K/MCL (ref 0.3–0.9)
MONOCYTES NFR BLD: 8 %
NEUTROPHILS # BLD: 3.7 K/MCL (ref 1.8–7.7)
NEUTROPHILS NFR BLD: 57 %
PLATELET # BLD AUTO: 259 K/MCL (ref 140–450)
RBC # BLD: 4.45 MIL/MCL (ref 4–5.2)
TIBC SERPL-MCNC: 276 MCG/DL (ref 250–450)
VIT B12 SERPL-MCNC: 864 PG/ML (ref 211–911)
WBC # BLD: 6.4 K/MCL (ref 4.2–11)

## 2025-08-26 PROCEDURE — 36415 COLL VENOUS BLD VENIPUNCTURE: CPT | Performed by: INTERNAL MEDICINE

## 2025-08-26 PROCEDURE — 99203 OFFICE O/P NEW LOW 30 MIN: CPT | Performed by: INTERNAL MEDICINE

## 2025-08-26 PROCEDURE — 99202 OFFICE O/P NEW SF 15 MIN: CPT

## 2025-08-26 ASSESSMENT — PAIN SCALES - GENERAL: PAINLEVEL_OUTOF10: 0

## 2025-08-26 ASSESSMENT — PATIENT HEALTH QUESTIONNAIRE - PHQ9: SUM OF ALL RESPONSES TO PHQ9 QUESTIONS 1 AND 2: 0

## 2025-08-27 LAB
B2 GLYCOPROT1 IGA SERPL IA-ACNC: 3 U/ML
B2 GLYCOPROT1 IGG SERPL IA-ACNC: 50 U/ML
B2 GLYCOPROT1 IGM SERPL IA-ACNC: <2.5 U/ML
CARDIOLIPIN IGA SER IA-ACNC: 4.4 U/ML
CARDIOLIPIN IGG SER IA-ACNC: 25 U/ML
CARDIOLIPIN IGM SER IA-ACNC: 2.6 U/ML
PATH REV: NORMAL
SCREEN APTT: 34.7 SEC (ref 23–36)
SCREEN DRVVT/NORMAL: 1.01 %
SCREEN DRVVT: 39.6 SEC
THROMBIN TIME: 14 SEC (ref 12–17)

## 2026-06-08 ENCOUNTER — APPOINTMENT (OUTPATIENT)
Dept: FAMILY MEDICINE | Age: 41
End: 2026-06-08

## (undated) NOTE — LETTER
2025      Elfego Moffett MD  [address]  [address]  [facility] [address]  Via Outside Provider Messaging  Patient: Sera Echeverria  : 9/10/1985    Dear Colleague:  Thank you for referring your patient to me for a Maternal Fetal Medicine evaluation. Please see my attached note for my findings and recommendations.      Should you have any questions or concerns, please do not hesitate to contact me at the number listed below.    Best Regards,      Malinda Crowder MD  [facility]   [address]  [facility] [address]  [phone]    cc: No Recipients      [facility] Department of Maternal Fetal Medicine  Patient Name: Sera Echeverria  Patient : 9/10/1985  Physician: Malinda Crowder MD    Outpatient Maternal-Fetal Medicine Follow-Up     Dear Dr. Moffett,     Thank you for requesting ultrasound evaluation and maternal fetal medicine consultation on your patient Sera Echeverria.  As you are aware she is a 39 year old female  with a [place] pregnancy and an Estimated Date of Delivery: 25.  She returned to maternal-fetal medicine today for a follow-up visit.  Her history was reviewed from her prior visit and there were no interval changes.    She passed her GDM screen.     Antepartum Risk Factors  Advanced maternal age, low risk cell free DNA screen  Pregnancy from assisted reproductive technology  Antiphospholipid syndrome  Levothyroxine is for optimization of IVF not due to hypothyroidism    S: She is tolerating the enoxaparin well.  She is on levothyroxine treatment recommendations from her IVF physician although she was always under the cutoff of 2.5 and her TSH.    TSH was 0.953 on 25    PHYSICAL EXAMINATION:  /60 (BP Location: Right arm, Patient Position: Sitting, Cuff Size: adult)   Pulse 84   Ht 5' 3\" (1.6 m)   Wt 137 lb (62.1 kg)   BMI 24.27 kg/m²   General: alert and oriented, no acute distress  Abdomen:  gravid, soft, non-tender  Extremities: non-tender, no edema     OBSTETRIC ULTRASOUND  The patient had a fetal follow up ultrasound today which I interpreted the results and reviewed them with the patient.    Ultrasound Findings:  Single IUP in cephalic presentation.    Placenta is anterior.   A 3 vessel cord is noted.  Cardiac activity is present at 140 bpm  EFW 1398 g ( 3 lb 1 oz); 61%.    MVP is 4.8 cm . LONDON 17.3 cm    The fetal measurements are consistent with established EDC. No gross ultrasound evidence of structural abnormalities are seen today. The patient understands that ultrasound cannot rule out all structural and chromosomal abnormalities.       See imaging tab for the complete US report.     DISCUSSION  During her visit we discussed and reviewed the following issues:  IVF GESTATION  Conception by IVF is associated with an increased incidence of several obstetrical and  complications which were discussed previously and reviewed.  See [facility] note from 2024 for detailed review.     ADVANCED MATERNAL AGE - morbidity and mortality associated with advancing maternal age was discussed previously and reviewed.  See [facility] note from 2024 for detailed review.    I reviewed with the patient that pregnancies in women of advanced maternal age (35 or older at delivery) are associated with elevated risks.  Specifically, there is a higher rate of:     Fetal malformations  Preeclampsia  Gestational diabetes  Intrauterine fetal death     As a result, enhanced pregnancy surveillance is advised for these patients including a comprehensive ultrasound to assess for fetal malformations  (at 20 weeks) and a third trimester ultrasound assessment for fetal growth (at 32 weeks).  In addition, weekly NST's (initiating at 36 weeks gestation for women 35-39 years and at 32 weeks gestation for women 40 years and older) are also advised.  Routine obstetric care is more than adequate to assess for gestational  diabetes and preeclampsia; hence, no further significant alterations in obstetric care are advised.       ANTIPHOSPHOLIPID ANTIBODY SYNDROME  Diagnosed by reproductive endocrinologist        Antiphospholipid syndrome(APS) was discussed previously and reviewed.  See [facility] note from 2024 for detailed review.    We reviewed the signs and symptoms of preeclampsia,  labor and monitoring fetal movement.  We discussed reasons for her to call her physician.    We discussed the recommended plan of care based on her  risk factors.  Sera and her significant other, Cliff, had their questions answered to their satisfaction.       IMPRESSION:  IUP at 28w6d  Normal fetal growth  AMA: low-risk cell free fetal DNA and  low-risk PGS, declined invasive testing  IVF Gestation  Antiphospholipid antibody syndrome - on lovenox     RECOMMENDATIONS:  Continue care with Dr. Moffett  Monthly growth & BPP ultrasound  Weekly NST's at 32 weeks.  Twice weekly testing at 38 weeks - weekly NST and weekly BPP.  Delivery at 39-40 weeks.  Continue thromboprophylaxis with enoxaparin  Transition to unfractionated heparin (5,000 units subcu every 12 hours) at 37-38 weeks to facilitate placement of neuraxial analgesia for delivery  Resume enoxaparin 6-12 hours after vaginal delivery or 12-24 hours after  section and continue it for 6 weeks postpartum.  Calcium 1200mg daily     Total time spent 30 minutes this calendar day which includes preparing to see the patient including chart review, obtaining and/or reviewing additional medical history, performing a physical exam and evaluation, documenting clinical information in the electronic medical record, independently interpreting results, counseling the patient, communicating results to the patient/family/caregiver and coordinating care.     Case discussed with patient who demonstrated understanding and agreement with plan.     Thank you for allowing me to participate in the care  of this patient.  Please feel free to contact me with any questions.    Malinda Crowder MD  Maternal-Fetal Medicine       Note to patient and family:  The 21st Century Cures Act makes medical notes available to patients in the interest of transparency.  However, please be advised that this is a medical document.  It is intended as a peer to peer communication.  It is written in medical language and may contain abbreviations or verbiage that are technical and unfamiliar.  It may appear blunt or direct.  Medical documents are intended to carry relevant information, facts as evident, and the clinical opinion of the practitioner.      Pt here for Growth Ultrasound  +fm noted per patient  Pt denies complaints.

## (undated) NOTE — LETTER
2024      Elfego Moffett MD  [address]  [address]  [place] [id number]  Via Outside Provider Messaging  Patient: Sera Tee  : 9/10/1985    Dear Colleague:  Thank you for referring your patient to me for a Maternal Fetal Medicine evaluation. Please see my attached note for my findings and recommendations.      Should you have any questions or concerns, please do not hesitate to contact me at the number listed below.    Best Regards,      Juanjose Hidalgo MD  [facility]  [address]  [facility] [address]  [phone]    cc: No Recipients      [facility] Department of Maternal Fetal Medicine  Patient Name: Sera Tee  Patient : 9/10/1985  Physician: Juanjose Hidalgo MD    Outpatient Maternal-Fetal Medicine Consultation    Dear Dr. Moffett    Thank you for requesting ultrasound evaluation and maternal fetal medicine consultation on your patient Sera Tee.  As you are aware she is a 39 year old female  with a singletonpregnancy and an Estimated Date of Delivery: 25.  A maternal-fetal medicine consultation was requested secondary to advanced maternal age, IVF gestation, and APAS .  Her prenatal records and labs were reviewed.    HISTORY  Past OB History  OB History last reviewed by Subha Keen on 2024     4    Para   1    Term   1            AB   2    Living   1      SAB   2    IAB        Ectopic        Multiple        Live Births   1          # Outcome Date GA Labor/2nd Weight Sex Type Anes PTL Lv A1 A5   1 SAB 2020 12w0d       Living        2 SAB 2020 8w0d               3 Term 10/09/21 39w2d  7 lb 10.4 oz (3.47 kg) M CS-LTranv Epidural N Living 9 9   Name: PHIL TEE   Location: Other   Delivering Clinician: Moshe Martinez DO          Past Medical History  unremarkable    Past Surgical History  C/S    Family History  The patient has no family status information on file.        Medications:   Current Outpatient Medications:   •  Prenatal Vit w/Jj-Iurordvfr-QR (PNV OR), Take by mouth daily., Disp: , Rfl:   •  enoxaparin (LOVENOX) 40 MG/0.4ML Injection Solution Prefilled Syringe, Inject 0.4 mL (40 mg total) into the skin daily., Disp: , Rfl:   •  levothyroxine 25 MCG Oral Tab, Take 1 tablet (25 mcg total) by mouth before breakfast., Disp: , Rfl:   •  aspirin 81 MG Oral Tab EC, Take 1 tablet (81 mg total) by mouth daily., Disp: , Rfl:   Allergies: Allergies[1]    PHYSICAL EXAMINATION:  /58 (BP Location: Right arm, Patient Position: Sitting, Cuff Size: adult)   Pulse 101   Ht 5' 3\" (1.6 m)   Wt 127 lb (57.6 kg)   BMI 22.50 kg/m²   General: alert and oriented,no acute distress  Abdomen: gravid, soft, non-tender  Extremities: non-tender, no edema    OBSTETRIC ULTRASOUND  The patient had a level II ultrasound today which revealed size consistent with dates and a normal detailed anatomic survey.      Ultrasound Findings:  Single IUP in breech presentation.    Placenta is left lateral.   A 3 vessel cord is noted.  Cardiac activity is present at 137 bpm   g ( 0 lb 12 oz);   MVP is 5.9 cm .     The fetal measurements are consistent with the established EDC. No ultrasound evidence of structural abnormalities are seen today. The nasal bone is present. No ultrasound evidence of markers for aneuploidy are seen. She understands that ultrasound exam cannot exclude genetic abnormalities and that genetic testing is recommended. The limitations of ultrasound were discussed.     Uterus and adnexa appeared normal  today on US    See PACS/Imaging Tab For Complete Ultrasound Report  I interpreted the results and reviewed them with the patient.    DISCUSSION  During her visit we discussed and reviewed the following issues:  IVF GESTATION  Conception by IVF is associated with an increased incidence of several obstetrical and  complications. Most of these are related to the high  incidence of multiple gestations. The precise reasons for this increase in adverse outcomes are not clear.    ART is associated with an up to two-fold increased risk of  birth and low birth weight in [place] pregnancies. ART does not appear to be an independent risk factor for adverse neurodevelopment outcome. ART appears to be at increased risk of delivering offspring with congenital malformations compared with fertile women who conceive naturally. Heart defects have been reported as high at 6 % so fetal echocardiography is recommended in all IVF patients. Stillbirth and  mortality rates appear to be increased as much as four-fold.    [place] ART pregnancies, the relative risk of common pregnancy complications such as fetal growth restriction, preeclampsia, prematurity and  mortality are increased.    ADVANCED MATERNAL AGE    Background  I reviewed with the patient that pregnancies in women of advanced maternal age (35 or older at delivery) are associated with elevated risks.  Specifically, there is a higher rate of:    Fetal malformations  Preeclampsia  Gestational diabetes  Intrauterine fetal death    As a result, enhanced pregnancy surveillance is advised for these patients including a comprehensive ultrasound to assess for fetal malformations  (at 20 weeks) and a third trimester ultrasound assessment for fetal growth (at 32 weeks).  In addition, weekly NST's (initiating at 36 weeks gestation for women 35-39 years and at 32 weeks gestation for women 40 years and older) are also advised.  Routine obstetric care is more than adequate to assess for gestational diabetes and preeclampsia; hence, no further significant alterations in obstetric care are advised.    Fetal Aneuploidy    We also discussed the increased risk of chromosomal abnormalities associated with advanced maternal age.  I reviewed that an ultrasound examination cannot reliably exclude potential genetic abnormalities.  Given that the patient will be 39 years old at the time of delivery I reviewed that her risk (at delivery) of having a  with any chromosome abnormality is 1:66 and with trisomy 21 is 1: 106.    Invasive Testing    I offered invasive genetic testing (amniocentesis, chorionic villus sampling) after reviewing the diagnostic accuracy of these tests as well as the procedure associated loss rate (1:500 for genetic amniocentesis).    She ultimately does not desire invasive genetic testing.     Non-invasive Pregnancy Testing (NIPT)    I reviewed current non-invasive screening options.  Currently non-invasive pregnancy testing (NIPT) offers the highest detection rate (with the lowest false positive rate) for the detection of fetal aneuploidy amongst high-risk patients.  The limitations of detailed mid-trimester sonography was reviewed with the patient.  First trimester screening and second trimester multiple-marker serum serum screening as alternative aneuploidy screening options were also reviewed.  However, both of these tests are associated with lower detection and higher false positive rates.    The patient has already obtained a low-risk PGS result and was appropriately reassured.  Also low-risk NIPT    ANTIPHOSPHOLIPID ANTIBODY SYNDROME  Diagnosed by reproductive endocrinologist        Antiphospholipid syndrome(APS) is an autoimmune disorder.  APS is defined as the presence in the serum of at least one type of autoantibody known as an antiphospholipid antibody (aPL) and  one or more episodes of venous, arterial, or small vessel thrombosis and/or morbidity with pregnancy(Otherwise unexplained death at 10 weeks gestation of a morphologically normal fetus OR One or more premature births before 34 weeks of gestation because of eclampsia, preeclampsia, or placental insufficiency OR - Three or more embryonic (<10 week gestation) pregnancy losses unexplained by maternal or paternal chromosomal abnormalities or maternal  anatomic or hormonal causes).          APS is associated with thrombosis, autoimmune thrombocytopenia and early fetal loss.  Pregnancies are frequently complicated with preeclampsia, IUGR, placental insufficiency,  delivery and IUFD.  The most worrisome maternal risk is thrombosis and pregnancy increases that risk to as high as 12%.  Recurrence risk of thrombosis if untreated is 25%.     IMPRESSION:  IUP at 20w3d  AMA: low-risk cell free fetal DNA and  low-risk PGS, declined invasive testing  IVF Gestation  Antiphospholipid antibody syndrome - on lovenox    RECOMMENDATIONS:  Continue care with Dr. Moffett  Fetal echocardiogram at 24 weeks  Monthly growth scans in the third trimester with addition of BPP at 32 weeks  Weekly NST's at 32 weeks.  Twice weekly testing at 38 weeks - weekly NST and weekly BPP.  Delivery at 39-40 weeks.  Continue thromboprophylaxis with enoxaparin  Transition to unfractionated heparin (5,000 units subcu every 12 hours) at 36 weeks to facilitate placement of neuraxial analgesia in labor  Resume enoxaparin 6-12 hours after vaginal delivery or 12-24 hours after  section and continue it for 6 weeks postpartum.  Calcium 1200mg daily    Thank you for allowing me to participate in the care of your patient.  Please do not hesitate to contact me if additional questions or concerns arise.      Juanjose Hidalgo M.D.    40 minutes spent in review of records, patient consultation, documentation and coordination of care.  The relevant clinical matter(s) are summarized above.     Note to patient and family  The 21st Century Cures Act makes medical notes available to patients in the interest of transparency.  However, please be advised that this is a medical document.  It is intended as [id number] communication.  It is written and medical language may contain abbreviations or verbiage that are technical and unfamiliar.  It may appear blunt or direct.  Medical documents are intended to carry  relevant information, facts as evident, and the clinical opinion of the practitioner.         [1] Not on File      Pt here for Level II Ultrasound  +fm noted per patient  Pt denies complaints.

## (undated) NOTE — LETTER
2025      Elfego Moffett MD  [address]  [address]  [facility] [address]  Via Outside Provider Messaging  Patient: Sera Echeverria  : 9/10/1985    Dear Colleague:  Thank you for referring your patient to me for a Maternal Fetal Medicine evaluation. Please see my attached note for my findings and recommendations.      Should you have any questions or concerns, please do not hesitate to contact me at the number listed below.    Best Regards,      Malinda Crowder MD  [facility]   [address]  [facility] [address]  [phone]    cc: No Recipients      [facility] Department of Maternal Fetal Medicine  Patient Name: Sera Echeverria  Patient : 9/10/1985  Physician: Malinda Crowder MD    Pt here for growth ultrasound  + FM  No complaints    Outpatient Maternal-Fetal Medicine Follow-Up     Dear Dr. Moffett,     Thank you for requesting ultrasound evaluation and maternal fetal medicine consultation on your patient Sera Echeverria.  As you are aware she is a 39 year old female  with a [place] pregnancy and an Estimated Date of Delivery: 25.  She returned to maternal-fetal medicine today for a follow-up visit.  Her history was reviewed from her prior visit and there were no interval changes.    She passed her GDM screen.     Antepartum Risk Factors  Advanced maternal age, low risk cell free DNA screen  Pregnancy from assisted reproductive technology  Antiphospholipid syndrome  Levothyroxine is for optimization of IVF not due to hypothyroidism    S: She reports good fetal movement.  No change in medications.    She recently had influenza A and was treated with Tamiflu.    PHYSICAL EXAMINATION:  /69 (BP Location: Right arm, Patient Position: Sitting, Cuff Size: adult)   Pulse 92   Ht 5' 3\" (1.6 m)   Wt 139 lb (63 kg)   BMI 24.62 kg/m²   General: alert and oriented, no acute distress  Abdomen: gravid, soft,  non-tender  Extremities: non-tender, no edema     OBSTETRIC ULTRASOUND  The patient had a fetal follow up ultrasound today which I interpreted the results and reviewed them with the patient.    Ultrasound Findings:  Single IUP in cephalic presentation.    Placenta is fundal.   A 3 vessel cord is noted.  Cardiac activity is present at 124 bpm  EFW 2164 g ( 4 lb 12 oz); 53%.    LONDON is  8.1 cm.  MVP is 3.1 cm  BPP is 8/8.     The fetal measurements are consistent with established EDC. No gross ultrasound evidence of structural abnormalities are seen today. The patient understands that ultrasound cannot rule out all structural and chromosomal abnormalities.       See imaging tab for the complete US report.     DISCUSSION  During her visit we discussed and reviewed the following issues:  IVF GESTATION  Conception by IVF is associated with an increased incidence of several obstetrical and  complications which were discussed previously and reviewed.  See [facility] note from 2024 for detailed review.     ADVANCED MATERNAL AGE - morbidity and mortality associated with advancing maternal age was discussed previously and reviewed.  See [facility] note from 2024 for detailed review.     ANTIPHOSPHOLIPID ANTIBODY SYNDROME  Diagnosed by reproductive endocrinologist        Antiphospholipid syndrome(APS) was discussed previously and reviewed.  See [facility] note from 2024 for detailed review.    We reviewed the signs and symptoms of preeclampsia,  labor and monitoring fetal movement.  We discussed reasons for her to call her physician.    We discussed the recommended plan of care based on her  risk factors.  Sera had her questions answered to her satisfaction.       IMPRESSION:  IUP at 32w6d  Normal fetal growth &  testing  AMA: low-risk cell free fetal DNA and  low-risk PGS, declined invasive testing  IVF Gestation  Antiphospholipid antibody syndrome - on lovenox      RECOMMENDATIONS:  Continue care with Dr. Moffett  Monthly growth & BPP ultrasound  Weekly NST  Twice weekly testing at 38 weeks - weekly NST and weekly BPP.  Delivery at 39-40 weeks.  Continue thromboprophylaxis with enoxaparin  Transition to unfractionated heparin (5,000 units subcu every 12 hours) at 37-38 weeks to facilitate placement of neuraxial analgesia for delivery  Resume enoxaparin 6-12 hours after vaginal delivery or 12-24 hours after  section and continue it for 6 weeks postpartum.  Calcium 1200mg daily     Total time spent 20 minutes this calendar day which includes preparing to see the patient including chart review, obtaining and/or reviewing additional medical history, performing a physical exam and evaluation, documenting clinical information in the electronic medical record, independently interpreting results, counseling the patient, communicating results to the patient/family/caregiver and coordinating care.     Case discussed with patient who demonstrated understanding and agreement with plan.     Thank you for allowing me to participate in the care of this patient.  Please feel free to contact me with any questions.    Malinda Crowder MD  Maternal-Fetal Medicine       Note to patient and family:  The 21st Century Cures Act makes medical notes available to patients in the interest of transparency.  However, please be advised that this is a medical document.  It is intended as a peer to peer communication.  It is written in medical language and may contain abbreviations or verbiage that are technical and unfamiliar.  It may appear blunt or direct.  Medical documents are intended to carry relevant information, facts as evident, and the clinical opinion of the practitioner.

## (undated) NOTE — LETTER
2024      Elfego Moffett MD  [address]  [address]  [facility] [address]  Via Outside Provider Messaging  Patient: Sera Echeverria  : 9/10/1985    Dear Colleague:  Thank you for referring your patient to me for a Maternal Fetal Medicine evaluation. Please see my attached note for my findings and recommendations.      Should you have any questions or concerns, please do not hesitate to contact me at the number listed below.    Best Regards,      Malinda Crowder MD  [facility]   [address]  [facility] [address]  [phone]    cc: No Recipients      [facility] Department of Maternal Fetal Medicine  Patient Name: Sera Echeverria  Patient : 9/10/1985  Physician: Malinda Crowder MD    Pt here for fetal echo  +FM  No complaints    Outpatient Maternal-Fetal Medicine Follow-Up     Dear Dr. Moffett,     Thank you for requesting ultrasound evaluation and maternal fetal medicine consultation on your patient Sera Echeverria.  As you are aware she is a 39 year old female  with a [place] pregnancy and an Estimated Date of Delivery: 25.  She returned to maternal-fetal medicine today for a follow-up visit.  Her history was reviewed from her prior visit and there were no interval changes.     Antepartum Risk Factors  Advanced maternal age, low risk cell free DNA screen  Pregnancy from assisted reproductive technology  Antiphospholipid syndrome  Levothyroxine is for optimization of IVF not due to hypothyroidism    S: She is tolerating the enoxaparin well.  She is on levothyroxine treatment recommendations from her IVF physician although she was always under the cutoff of 2.5 and her TSH.    PHYSICAL EXAMINATION:  /59 (BP Location: Right arm, Patient Position: Sitting, Cuff Size: adult)   Pulse 101   Ht 5' 3\" (1.6 m)   Wt 133 lb (60.3 kg)   BMI 23.56 kg/m²   General: alert and oriented, no acute distress  Abdomen: gravid, soft,  non-tender  Extremities: non-tender, no edema     OBSTETRIC ULTRASOUND  The patient had a fetal echocardiogram ultrasound today which I interpreted the results and reviewed them with the patient.    FETAL ECHOCARDIOGRAM:    Single IUP in , transverse presentation.    Placenta is anterior, left.   A 3 vessel cord is noted.  Cardiac activity is present at 150 bpm  MVP is 4.5 cm .      A transabdominal 2D fetal echocardiogram with Doppler and color mapping was performed. There is a four-chamber heart of appropriate cardiac dimensions. There is situs solitus with levocardia. Intracardiac connection appear to be normal.  The  atrioventricular valves appear normal. There is no evidence of pericardial or pleural effusion. The A-V conduction is one to one and the heart rate is appropriate for gestational age. No evidence of fetal arrhythmias is seen during today's study. There is a right to left shunting across the patent kade ovale. There is a normal appearance of the aorta and branching pattern of the head vessels.    Normal fetal Doppler interrogations    There appears to be a structurally  normal fetal heart and rhythm. The patient was made aware of the limitations of the fetal heart study: malformations such as but not limiting to minor valve , VSD, anomalous pulmonary venus return, or coarctation of the aorta maybe missed. I discussed the results with the patient.       See imaging tab for the complete US report.     DISCUSSION  During her visit we discussed and reviewed the following issues:  IVF GESTATION  Conception by IVF is associated with an increased incidence of several obstetrical and  complications which were discussed previously and reviewed.  See [facility] note from 2024 for detailed review.     ADVANCED MATERNAL AGE - morbidity and mortality associated with advancing maternal age was discussed previously and reviewed.  See [facility] note from 2024 for detailed review.    I reviewed  with the patient that pregnancies in women of advanced maternal age (35 or older at delivery) are associated with elevated risks.  Specifically, there is a higher rate of:     Fetal malformations  Preeclampsia  Gestational diabetes  Intrauterine fetal death     As a result, enhanced pregnancy surveillance is advised for these patients including a comprehensive ultrasound to assess for fetal malformations  (at 20 weeks) and a third trimester ultrasound assessment for fetal growth (at 32 weeks).  In addition, weekly NST's (initiating at 36 weeks gestation for women 35-39 years and at 32 weeks gestation for women 40 years and older) are also advised.  Routine obstetric care is more than adequate to assess for gestational diabetes and preeclampsia; hence, no further significant alterations in obstetric care are advised.       ANTIPHOSPHOLIPID ANTIBODY SYNDROME  Diagnosed by reproductive endocrinologist        Antiphospholipid syndrome(APS) was discussed previously and reviewed.  See [facility] note from 2024 for detailed review.    We reviewed the signs and symptoms of preeclampsia,  labor and monitoring fetal movement.  We discussed reasons for her to call her physician.    We discussed the recommended plan of care based on her  risk factors.  Sera and her significant other, Cliff, had their questions answered to their satisfaction.       IMPRESSION:  IUP at 24w6d  Normal fetal echocardiogram  AMA: low-risk cell free fetal DNA and  low-risk PGS, declined invasive testing  IVF Gestation  Antiphospholipid antibody syndrome - on lovenox     RECOMMENDATIONS:  Continue care with Dr. Moffett  Monthly growth scans in the third trimester with addition of BPP at 32 weeks  Weekly NST's at 32 weeks.  Twice weekly testing at 38 weeks - weekly NST and weekly BPP.  Delivery at 39-40 weeks.  Continue thromboprophylaxis with enoxaparin  Transition to unfractionated heparin (5,000 units subcu every 12 hours) at 36 weeks  to facilitate placement of neuraxial analgesia in labor  Resume enoxaparin 6-12 hours after vaginal delivery or 12-24 hours after  section and continue it for 6 weeks postpartum.  Calcium 1200mg daily     Total time spent 30 minutes this calendar day which includes preparing to see the patient including chart review, obtaining and/or reviewing additional medical history, performing a physical exam and evaluation, documenting clinical information in the electronic medical record, independently interpreting results, counseling the patient, communicating results to the patient/family/caregiver and coordinating care.     Case discussed with patient who demonstrated understanding and agreement with plan.     Thank you for allowing me to participate in the care of this patient.  Please feel free to contact me with any questions.    Malinda Crowder MD  Maternal-Fetal Medicine       Note to patient and family:  The 21st Century Cures Act makes medical notes available to patients in the interest of transparency.  However, please be advised that this is a medical document.  It is intended as a peer to peer communication.  It is written in medical language and may contain abbreviations or verbiage that are technical and unfamiliar.  It may appear blunt or direct.  Medical documents are intended to carry relevant information, facts as evident, and the clinical opinion of the practitioner.